# Patient Record
Sex: FEMALE | Race: WHITE | Employment: FULL TIME | URBAN - METROPOLITAN AREA
[De-identification: names, ages, dates, MRNs, and addresses within clinical notes are randomized per-mention and may not be internally consistent; named-entity substitution may affect disease eponyms.]

---

## 2017-09-09 ENCOUNTER — TELEPHONE (OUTPATIENT)
Dept: INTERNAL MEDICINE CLINIC | Facility: CLINIC | Age: 37
End: 2017-09-09

## 2019-11-18 ENCOUNTER — OFFICE VISIT (OUTPATIENT)
Dept: INTERNAL MEDICINE CLINIC | Facility: CLINIC | Age: 39
End: 2019-11-18

## 2019-11-18 ENCOUNTER — NURSE TRIAGE (OUTPATIENT)
Dept: INTERNAL MEDICINE CLINIC | Facility: CLINIC | Age: 39
End: 2019-11-18

## 2019-11-18 VITALS
OXYGEN SATURATION: 98 % | SYSTOLIC BLOOD PRESSURE: 127 MMHG | HEIGHT: 63.5 IN | TEMPERATURE: 99 F | HEART RATE: 91 BPM | WEIGHT: 271 LBS | DIASTOLIC BLOOD PRESSURE: 86 MMHG | BODY MASS INDEX: 47.42 KG/M2

## 2019-11-18 DIAGNOSIS — J30.9 ALLERGIC RHINITIS, UNSPECIFIED SEASONALITY, UNSPECIFIED TRIGGER: Primary | ICD-10-CM

## 2019-11-18 DIAGNOSIS — R22.0 LIP SWELLING: ICD-10-CM

## 2019-11-18 PROCEDURE — 99203 OFFICE O/P NEW LOW 30 MIN: CPT | Performed by: PHYSICIAN ASSISTANT

## 2019-11-19 NOTE — PROGRESS NOTES
HPI:    Patient ID: Ti Azar is a 37year old female. HPI  Patient presents with lower lip swelling which started today. Patient notes that on Friday she developed a head cold which got worse on Sunday so she started taking Mucinex DM.   After she • Heart Disease Maternal Grandfather    • Heart Disease Paternal Grandfather    • Diabetes Paternal Grandfather          PHYSICAL EXAM:   /86 (BP Location: Right arm, Patient Position: Sitting, Cuff Size: large)   Pulse 91   Temp 98.6 °F (37 °C) (Ora Meds This Visit:  Requested Prescriptions      No prescriptions requested or ordered in this encounter       Imaging & Referrals:  None         WI#[id number]

## 2020-07-09 ENCOUNTER — TELEPHONE (OUTPATIENT)
Dept: INTERNAL MEDICINE CLINIC | Facility: CLINIC | Age: 40
End: 2020-07-09

## 2020-07-09 ENCOUNTER — NURSE TRIAGE (OUTPATIENT)
Dept: INTERNAL MEDICINE CLINIC | Facility: CLINIC | Age: 40
End: 2020-07-09

## 2020-07-09 ENCOUNTER — OFFICE VISIT (OUTPATIENT)
Dept: INTERNAL MEDICINE CLINIC | Facility: CLINIC | Age: 40
End: 2020-07-09

## 2020-07-09 VITALS
RESPIRATION RATE: 16 BRPM | BODY MASS INDEX: 40.12 KG/M2 | DIASTOLIC BLOOD PRESSURE: 81 MMHG | HEART RATE: 94 BPM | WEIGHT: 235 LBS | HEIGHT: 64 IN | SYSTOLIC BLOOD PRESSURE: 146 MMHG

## 2020-07-09 DIAGNOSIS — L50.9 HIVES: ICD-10-CM

## 2020-07-09 DIAGNOSIS — H57.89 EYE SWOLLEN, RIGHT: Primary | ICD-10-CM

## 2020-07-09 DIAGNOSIS — Z12.31 VISIT FOR SCREENING MAMMOGRAM: Primary | ICD-10-CM

## 2020-07-09 PROCEDURE — 99213 OFFICE O/P EST LOW 20 MIN: CPT | Performed by: INTERNAL MEDICINE

## 2020-07-09 RX ORDER — METHYLPREDNISOLONE 4 MG/1
TABLET ORAL
Qty: 1 KIT | Refills: 0 | Status: SHIPPED | OUTPATIENT
Start: 2020-07-09 | End: 2021-03-19 | Stop reason: ALTCHOICE

## 2020-07-09 NOTE — TELEPHONE ENCOUNTER
Pt overdue for mammo. Order generated, per SANTI's approval.     LM for pt - reminding her to schedule.

## 2020-07-09 NOTE — TELEPHONE ENCOUNTER
Action Requested: Summary for Provider     []  Critical Lab, Recommendations Needed  [] Need Additional Advice  [x]   FYI    []   Need Orders  [] Need Medications Sent to Pharmacy  []  Other     SUMMARY: Since last night right top eyelid is swollen and ali

## 2020-07-13 NOTE — PROGRESS NOTES
Kristi Paul is a 41year old female. HPI:     1. Eye swollen, right    The patient has noted some swelling under her right eyelid over the last several days. She has had this before in the last year and a recurrent basis with no obvious source found. swollen, right    Apply cold compresses to eye.   Because of the recurrent nature of the swelling on her eyelid which could be an actual hive in the area I have advised the patient to apply cold compresses to not place any other material on the eye and to s

## 2020-07-17 ENCOUNTER — TELEMEDICINE (OUTPATIENT)
Dept: INTERNAL MEDICINE CLINIC | Facility: CLINIC | Age: 40
End: 2020-07-17

## 2020-07-17 VITALS — WEIGHT: 235 LBS | HEIGHT: 64 IN | BODY MASS INDEX: 40.12 KG/M2

## 2020-07-17 DIAGNOSIS — Z71.89 COUNSELED ABOUT COVID-19 VIRUS INFECTION: ICD-10-CM

## 2020-07-17 DIAGNOSIS — T78.3XXD ANGIOEDEMA, SUBSEQUENT ENCOUNTER: Primary | ICD-10-CM

## 2020-07-17 PROBLEM — T78.3XXA ANGIOEDEMA: Status: ACTIVE | Noted: 2020-07-17

## 2020-07-17 PROCEDURE — 3008F BODY MASS INDEX DOCD: CPT | Performed by: INTERNAL MEDICINE

## 2020-07-17 PROCEDURE — 99214 OFFICE O/P EST MOD 30 MIN: CPT | Performed by: INTERNAL MEDICINE

## 2020-07-18 NOTE — ASSESSMENT & PLAN NOTE
Recurrent episodes of swelling perioral, periorbital lasting for about 4 to 6 hours, gradually resolving. Started about a year back during the summer, resolved during the winter and recurred again in March.   Has not noted any specific association with john

## 2020-07-18 NOTE — PROGRESS NOTES
HPI:    Patient ID: Ben Thompson is a 41year old female.   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 Is worried about risks for herself as well as her family. Had an extensive discussion about the real risks that she may face.   Given the fact that the children at school are a relatively healthy population and because fernando ALLERGY OR) Take by mouth. Allergies:  Mucinex Dm Maximum *    HIVES, SWELLING  Adhesive Tape           RASH     There were no vitals filed for this visit. Body mass index is 40.34 kg/m².     PHYSICAL EXAM:   Physical Exam   Constitutional: She is or type 2 diabetes as a reason not to be available for in person classes. Explained to her that this is an air-bone disease, she will need to wear a mask. She will need to reduce direct contact with the students and maintain safe distance at all times.   Jose Stroud

## 2020-07-18 NOTE — ASSESSMENT & PLAN NOTE
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     Patient is concerned about her body habitus and the fact that her  has type 2 diabetes as a reason not

## 2020-08-06 ENCOUNTER — NURSE ONLY (OUTPATIENT)
Dept: ALLERGY | Facility: CLINIC | Age: 40
End: 2020-08-06

## 2020-08-06 ENCOUNTER — OFFICE VISIT (OUTPATIENT)
Dept: ALLERGY | Facility: CLINIC | Age: 40
End: 2020-08-06

## 2020-08-06 ENCOUNTER — LAB ENCOUNTER (OUTPATIENT)
Dept: LAB | Age: 40
End: 2020-08-06
Attending: ALLERGY & IMMUNOLOGY
Payer: COMMERCIAL

## 2020-08-06 VITALS
SYSTOLIC BLOOD PRESSURE: 142 MMHG | TEMPERATURE: 99 F | HEART RATE: 88 BPM | RESPIRATION RATE: 16 BRPM | DIASTOLIC BLOOD PRESSURE: 89 MMHG | OXYGEN SATURATION: 98 %

## 2020-08-06 DIAGNOSIS — T78.3XXA ANGIOEDEMA, INITIAL ENCOUNTER: Primary | ICD-10-CM

## 2020-08-06 DIAGNOSIS — R22.0 LIP SWELLING: ICD-10-CM

## 2020-08-06 DIAGNOSIS — T78.3XXA ANGIOEDEMA, INITIAL ENCOUNTER: ICD-10-CM

## 2020-08-06 DIAGNOSIS — Z91.018 FOOD ALLERGY: ICD-10-CM

## 2020-08-06 LAB
C4 SERPL-MCNC: 28.9 MG/DL (ref 10–40)
TSI SER-ACNC: 3.24 MIU/ML (ref 0.36–3.74)

## 2020-08-06 PROCEDURE — 99244 OFF/OP CNSLTJ NEW/EST MOD 40: CPT | Performed by: ALLERGY & IMMUNOLOGY

## 2020-08-06 PROCEDURE — 36415 COLL VENOUS BLD VENIPUNCTURE: CPT

## 2020-08-06 PROCEDURE — 84443 ASSAY THYROID STIM HORMONE: CPT

## 2020-08-06 PROCEDURE — 86160 COMPLEMENT ANTIGEN: CPT

## 2020-08-06 PROCEDURE — 95024 IQ TESTS W/ALLERGENIC XTRCS: CPT | Performed by: ALLERGY & IMMUNOLOGY

## 2020-08-06 PROCEDURE — 86003 ALLG SPEC IGE CRUDE XTRC EA: CPT

## 2020-08-06 PROCEDURE — 3077F SYST BP >= 140 MM HG: CPT | Performed by: ALLERGY & IMMUNOLOGY

## 2020-08-06 PROCEDURE — 3079F DIAST BP 80-89 MM HG: CPT | Performed by: ALLERGY & IMMUNOLOGY

## 2020-08-06 PROCEDURE — 95004 PERQ TESTS W/ALRGNC XTRCS: CPT | Performed by: ALLERGY & IMMUNOLOGY

## 2020-08-06 RX ORDER — EPINEPHRINE 0.3 MG/.3ML
INJECTION SUBCUTANEOUS
Qty: 1 EACH | Refills: 0 | Status: SHIPPED | OUTPATIENT
Start: 2020-08-06

## 2020-08-06 RX ORDER — LEVOCETIRIZINE DIHYDROCHLORIDE 5 MG/1
5 TABLET, FILM COATED ORAL NIGHTLY
Qty: 30 TABLET | Refills: 0 | Status: SHIPPED | OUTPATIENT
Start: 2020-08-06 | End: 2021-09-21

## 2020-08-06 NOTE — PATIENT INSTRUCTIONS
Recs:   Handouts on angioedema provided and reviewed including common triggers  Check TSH level and C4  Check serum IgE to Beets   Recommend Xyzal 5 mg once a night at bedtime as needed  May increase Xyzal up to 2-4 times per day if needed  Consider tria

## 2020-08-06 NOTE — PROGRESS NOTES
Vasquez Gutierrez is a 41year old female. HPI:   Patient presents with:  New Patient: Referred by Dr. Jessica Berg for Allergic Reaction.      Patient is a 24-year-old female who presents for allergy consultation upon referral of her PCP, Dr. Dewayne Reddy tobacco: Never Used      Tobacco comment: No Household Smokers.      Alcohol use: No      Alcohol/week: 0.0 standard drinks    Drug use: No       Medications (Active prior to today's visit):  Current Outpatient Medications   Medication Sig Dispense Refill noted  Respiratory: normal to inspection lungs are clear to auscultation bilaterally normal respiratory effort   99% RA  Cardiovascular: regular rate and rhythm no murmurs, gallups, or rubs  Abdomen: soft non-tender non-distended  Skin/Hair: no unusual josue Dihydrochloride (XYZAL) 5 MG Oral Tab 30 tablet 0     Sig: Take 1 tablet (5 mg total) by mouth nightly.        Imaging & Referrals:  None     8/6/2020  Genesis Walter MD      If medication samples were provided today, they were provided solely for tari

## 2020-08-15 ENCOUNTER — TELEPHONE (OUTPATIENT)
Dept: ALLERGY | Facility: CLINIC | Age: 40
End: 2020-08-15

## 2020-08-15 NOTE — TELEPHONE ENCOUNTER
Viewed by Jorge Richards on 8/15/2020  7:48 AM   Written by Wilton Hart MD on 8/6/2020  3:30 PM   Sejal, your C4/complement 4 level and TSH were normal.  This is good news.  Please keep me posted of any further episodes of swelling.  Regards, Dr. Ilda Bell

## 2020-08-15 NOTE — TELEPHONE ENCOUNTER
Patient calling in to go over results. Patient confirmed name and . Patient verbalizes understanding of results and has no further questions at this time.

## 2020-08-15 NOTE — TELEPHONE ENCOUNTER
----- Message from Kevin Salamanca MD sent at 8/15/2020  7:42 AM CDT -----  Please call patient with negative serum IgE testing to beet

## 2020-08-15 NOTE — TELEPHONE ENCOUNTER
----- Message from Katherine Burk MD sent at 8/15/2020  7:42 AM CDT -----  Please call patient with negative serum IgE testing to beet

## 2020-09-02 ENCOUNTER — PATIENT MESSAGE (OUTPATIENT)
Dept: ALLERGY | Facility: CLINIC | Age: 40
End: 2020-09-02

## 2020-09-03 NOTE — TELEPHONE ENCOUNTER
Patient contacted, patient given Dr. Isabelle Calvillo advice as below. Patient agreed to avoid the ingredients in her Homemade Pancakes and the Carries Syrup.   Patient denies wanting to schedule a f/u appt at this time and states that if she has another angioedema

## 2020-09-03 NOTE — TELEPHONE ENCOUNTER
From: Dar Burnham  To: Joi Rowan MD  Sent: 9/2/2020 5:28 PM CDT  Subject: Non-Urgent Medical Question    Hi Dr Earnestine Weathers, I hope this is an ok way to send you a message about my face swelling. I know you said to tell you if it happened again.  This ti gain, and I'm concerned about that, since I've been doing well on my diet this year (I use an ed to help me count calories and I have lost 40 pounds so far) however, I am not eager to have an obstacle to my continued weight loss efforts.  I wanted your adv

## 2020-09-03 NOTE — TELEPHONE ENCOUNTER
Call reviewed and noted. Recommend to avoid those foods that are in question as potential triggers.   As per my previous note continue with Xyzal 5 mg once a night at bedtime up to 4 times per day if needed Benadryl every 4-6 hours as needed if refractory

## 2020-09-03 NOTE — TELEPHONE ENCOUNTER
Call reviewed and noted.   If her prior episode has resolved she may follow-up as needed or if worsening symptoms no later than 3 to 6 months from now

## 2020-09-03 NOTE — TELEPHONE ENCOUNTER
Patient contacted via telephone at [phone] this morning, 9/3/2020. Triage)    See patients MyChart message account of angioedema. Patient denies any current edema of face, tongue, lips, or airway.      Patient denies tightness in chest, shortness of br

## 2020-10-15 ENCOUNTER — NURSE TRIAGE (OUTPATIENT)
Dept: INTERNAL MEDICINE CLINIC | Facility: CLINIC | Age: 40
End: 2020-10-15

## 2020-10-15 NOTE — TELEPHONE ENCOUNTER
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 This morning her IBS flared up and had diarrhea and abdominal cramping.  Every morni

## 2020-10-15 NOTE — TELEPHONE ENCOUNTER
Okay to give her a note stating that she has IBS related symptoms. With regards to being overweight-  Being overweight does not increase the risk of daphnie Covid. It may increase the risk of complications very slightly.   I am not able to use that as

## 2020-10-17 NOTE — TELEPHONE ENCOUNTER
Skip Varela for Yue Miller Pt called us back today and she was informed of   message below. Pt stated that everyday she needs to answer 7 questions for her job and she answered yes to having loose stools and abd cramping.  Now her job needs a letter

## 2021-01-26 ENCOUNTER — TELEPHONE (OUTPATIENT)
Dept: ALLERGY | Facility: CLINIC | Age: 41
End: 2021-01-26

## 2021-01-26 NOTE — TELEPHONE ENCOUNTER
Patient is requesting to know if Dr. Rohit Weathers has any recommendations regarding patient getting the covid vaccine due to her allergies. Please advise.

## 2021-01-26 NOTE — TELEPHONE ENCOUNTER
Spoke with patient, reviewed Dr. Hannah Shankar recommendations:    \"Unless patient has a known history of reactions to polyethylene glycol or a prior adverse reaction to a prior Covid vaccine, the current national recommendations is that the benefits of Covid v

## 2021-02-26 ENCOUNTER — TELEPHONE (OUTPATIENT)
Dept: INTERNAL MEDICINE CLINIC | Facility: CLINIC | Age: 41
End: 2021-02-26

## 2021-02-27 ENCOUNTER — NURSE TRIAGE (OUTPATIENT)
Dept: INTERNAL MEDICINE CLINIC | Facility: CLINIC | Age: 41
End: 2021-02-27

## 2021-02-27 ENCOUNTER — OFFICE VISIT (OUTPATIENT)
Dept: FAMILY MEDICINE CLINIC | Facility: CLINIC | Age: 41
End: 2021-02-27

## 2021-02-27 VITALS
BODY MASS INDEX: 47.57 KG/M2 | HEIGHT: 64 IN | HEART RATE: 91 BPM | WEIGHT: 278.63 LBS | SYSTOLIC BLOOD PRESSURE: 131 MMHG | DIASTOLIC BLOOD PRESSURE: 85 MMHG | TEMPERATURE: 98 F
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
## 2021-02-27 DIAGNOSIS — S61.451A DOG BITE OF RIGHT HAND, INITIAL ENCOUNTER: Primary | ICD-10-CM

## 2021-02-27 PROBLEM — Z71.89 COUNSELED ABOUT COVID-19 VIRUS INFECTION: Status: RESOLVED | Noted: 2020-07-17 | Resolved: 2021-02-27

## 2021-02-27 PROCEDURE — 3008F BODY MASS INDEX DOCD: CPT | Performed by: PHYSICIAN ASSISTANT

## 2021-02-27 PROCEDURE — 99213 OFFICE O/P EST LOW 20 MIN: CPT | Performed by: PHYSICIAN ASSISTANT

## 2021-02-27 PROCEDURE — 3079F DIAST BP 80-89 MM HG: CPT | Performed by: PHYSICIAN ASSISTANT

## 2021-02-27 PROCEDURE — 3075F SYST BP GE 130 - 139MM HG: CPT | Performed by: PHYSICIAN ASSISTANT

## 2021-02-27 RX ORDER — AMOXICILLIN AND CLAVULANATE POTASSIUM 875; 125 MG/1; MG/1
1 TABLET, FILM COATED ORAL 2 TIMES DAILY
Qty: 14 TABLET | Refills: 0 | Status: SHIPPED | OUTPATIENT
Start: 2021-02-27 | End: 2021-03-06

## 2021-02-27 NOTE — PROGRESS NOTES
HPI:    Patient ID: Keli Richardson is a 41year old female. Patient presents for dog bite on her right hand and wrist that occurred yesterday afternoon. No fever/chills. No pain note now. Felt bruised last night. No draining.  Only broke the skin on the d hand. No draining. No tenderness noted. No warmth noted. Slight erythema noted. ASSESSMENT/PLAN:   1.  Dog bite of right hand, initial encounter  -After discussion with patient, advised the following:  -Told to take augmentin  -Educated pt on

## 2021-02-27 NOTE — TELEPHONE ENCOUNTER
Action Requested: Summary for Provider     []  Critical Lab, Recommendations Needed  [] Need Additional Advice  [x]   FYI    []   Need Orders  [] Need Medications Sent to Pharmacy  []  Other     SUMMARY: Patient states she was bit by a dog on the top of he

## 2021-03-01 ENCOUNTER — TELEPHONE (OUTPATIENT)
Dept: INTERNAL MEDICINE CLINIC | Facility: CLINIC | Age: 41
End: 2021-03-01

## 2021-03-01 ENCOUNTER — HOSPITAL ENCOUNTER (EMERGENCY)
Facility: HOSPITAL | Age: 41
Discharge: HOME | End: 2021-03-01
Payer: COMMERCIAL

## 2021-03-01 VITALS
RESPIRATION RATE: 17 BRPM | DIASTOLIC BLOOD PRESSURE: 92 MMHG | BODY MASS INDEX: 48.32 KG/M2 | TEMPERATURE: 98 F | OXYGEN SATURATION: 98 % | HEIGHT: 64 IN | HEART RATE: 92 BPM | SYSTOLIC BLOOD PRESSURE: 148 MMHG | WEIGHT: 283 LBS

## 2021-03-01 DIAGNOSIS — S61.451A DOG BITE OF RIGHT HAND, INITIAL ENCOUNTER: Primary | ICD-10-CM
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
## 2021-03-01 PROCEDURE — 90471 IMMUNIZATION ADMIN: CPT

## 2021-03-01 PROCEDURE — 99283 EMERGENCY DEPT VISIT LOW MDM: CPT

## 2021-03-01 NOTE — TELEPHONE ENCOUNTER
Informed patient of advice per Jose Ramon Henriquez. Patient had the Covid vaccination at 9:45 am today. Advised she inform the ER that she's had this as unknown possible interaction with rabies vaccine. Patient verbalizes understanding and agrees.

## 2021-03-01 NOTE — TELEPHONE ENCOUNTER
Message # [phone]         02/26/2021 05:16p   [[email or web address]]  To:  [email or web address]   From:  Valentino Rodney Primary MD:  Phone#:  [phone]  ----------------------------------------------------------------------  PT CALLED AND WANTS TO KNOW WHAT TO DO. GOT BIT BY DOG

## 2021-03-01 NOTE — TELEPHONE ENCOUNTER
Unfortunately we don't have the rabies vaccine. She should have to go to the ER to get the rabies shot.

## 2021-03-01 NOTE — ED PROVIDER NOTES
Patient Seen in: [facility] AND [facility] Emergency Department      History   Patient presents with:  Bite    Stated Complaint: dog bite     HPI/Subjective:   HPI    43-year-old female presents the emergency department for evaluation.   Patient states on Friday ED Triage Vitals [03/01/21 1213]   BP (!) 174/77   Pulse 102   Resp 17   Temp 98 °F (36.7 °C)   Temp src    SpO2 98 %   O2 Device None (Room air)       Current:BP (!) 174/77   Pulse 102   Temp 98 °F (36.7 °C)   Resp 17   Ht 162.6 cm (5' 4\")   Wt 128.4 kg Spoke with Dr. Lexis Gonzáles  with infectious disease. Since this bite appears to be provoked there is no reason at this point to suggest rabies. This was explained to the patient and she agrees.   Sensory and initially saw the patient to the vet was able to t

## 2021-03-01 NOTE — TELEPHONE ENCOUNTER
Spoke with pt,  verified, pt stated she was seen by Ashu NEVAREZ on 21 for a dog bite on right hand. Pt already started her abx ( Augmentin).   Pt stated the vet called her and since they don't know the hx of vaccination of the dog, stray dog, concer

## 2021-03-01 NOTE — ED INITIAL ASSESSMENT (HPI)
Pt presents with dog bite to right hand which occurred 1 week ago. Visible bruising to hand, wound closing and does not appear infected. Pt reports the bite was from a stray dog so the vet recommended a rabies shot. Pt taking oral antibiotics.     Pt receiv

## 2021-03-04 ENCOUNTER — TELEMEDICINE (OUTPATIENT)
Dept: INTERNAL MEDICINE CLINIC | Facility: CLINIC | Age: 41
End: 2021-03-04
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
## 2021-03-04 DIAGNOSIS — S61.451D DOG BITE OF RIGHT HAND, SUBSEQUENT ENCOUNTER: Primary | ICD-10-CM
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
## 2021-03-04 PROBLEM — S61.451A DOG BITE OF RIGHT HAND: Status: ACTIVE | Noted: 2021-03-04

## 2021-03-04 PROCEDURE — 99213 OFFICE O/P EST LOW 20 MIN: CPT | Performed by: NURSE PRACTITIONER

## 2021-03-04 NOTE — ASSESSMENT & PLAN NOTE
41year old female with recent dog bite. She was seen by PA Denver San and started on Augmentin. She is tolerating the Augmentin and her stool is soft.  She was initially seen on 2/27/201 and then her Vet told her to go to the ED because the dog was a stray and s

## 2021-03-04 NOTE — PROGRESS NOTES
HPI:    Patient ID: Janell Sarmiento is a 41year old female. Dog BIte  Follow up on Dog bite. She was initially seen on 2/27/2021.   The dog was a stray dog she took into her home and this dog had some diarrhea and she was cleaning the dog and he bit her rig Alcohol use: No        Alcohol/week: 0.0 standard drinks      Drug use: No      Sexual activity: Yes        Partners: Male    Other Topics      Concerns:        Caffeine Concern: Yes          coffee, 3cups weekly         Review of Systems   Constitution List Items Addressed This Visit     None             No follow-ups on file. No orders of the defined types were placed in this encounter.       Meds This Visit:  Requested Prescriptions      No prescriptions requested or ordered in this encounter       I

## 2021-03-13 DIAGNOSIS — Z23 NEED FOR VACCINATION: ICD-10-CM

## 2021-03-19 ENCOUNTER — OFFICE VISIT (OUTPATIENT)
Dept: INTERNAL MEDICINE CLINIC | Facility: CLINIC | Age: 41
End: 2021-03-19

## 2021-03-19 VITALS
HEIGHT: 63.2 IN | SYSTOLIC BLOOD PRESSURE: 110 MMHG | HEART RATE: 81 BPM | TEMPERATURE: 97 F | BODY MASS INDEX: 49.42 KG/M2 | DIASTOLIC BLOOD PRESSURE: 69 MMHG | WEIGHT: 282.38 LBS

## 2021-03-19 DIAGNOSIS — E53.9 VITAMIN B DEFICIENCY: ICD-10-CM

## 2021-03-19 DIAGNOSIS — Z12.31 VISIT FOR SCREENING MAMMOGRAM: ICD-10-CM

## 2021-03-19 DIAGNOSIS — E55.9 VITAMIN D DEFICIENCY: ICD-10-CM

## 2021-03-19 DIAGNOSIS — Z00.00 ROUTINE PHYSICAL EXAMINATION: Primary | ICD-10-CM

## 2021-03-19 DIAGNOSIS — E66.01 MORBID OBESITY WITH BODY MASS INDEX OF 40.0-49.9 (HCC): ICD-10-CM

## 2021-03-19 DIAGNOSIS — Z01.419 PAPANICOLAOU SMEAR, AS PART OF ROUTINE GYNECOLOGICAL EXAMINATION: ICD-10-CM

## 2021-03-19 PROCEDURE — 3078F DIAST BP <80 MM HG: CPT | Performed by: INTERNAL MEDICINE

## 2021-03-19 PROCEDURE — 3074F SYST BP LT 130 MM HG: CPT | Performed by: INTERNAL MEDICINE

## 2021-03-19 PROCEDURE — 3008F BODY MASS INDEX DOCD: CPT | Performed by: INTERNAL MEDICINE

## 2021-03-19 PROCEDURE — 99396 PREV VISIT EST AGE 40-64: CPT | Performed by: INTERNAL MEDICINE

## 2021-03-19 NOTE — ASSESSMENT & PLAN NOTE
Body mass index is 49.71 kg/m².    Wt Readings from Last 6 Encounters:  03/19/21 : 282 lb 6.4 oz (128.1 kg)  03/01/21 : 283 lb (128.4 kg)  02/27/21 : 278 lb 9.6 oz (126.4 kg)  07/17/20 : 235 lb (106.6 kg)  07/09/20 : 235 lb (106.6 kg)  11/18/19 : 271 lb (12

## 2021-03-19 NOTE — ASSESSMENT & PLAN NOTE
Normal exam.  Labs as ordered. Skin check normal.  No significant abnormal nevi. Breast exam completed–no palpable abnormalities, discharge from the nipples or axillary adenopathy.   Mammogram is due–recommend yearly mammogram as family history–maternal g

## 2021-03-19 NOTE — PATIENT INSTRUCTIONS
Problem List Items Addressed This Visit        Unprioritized    Morbid obesity with body mass index of 40.0-49.9 ([facility]     Body mass index is 49.71 kg/m².    Wt Readings from Last 6 Encounters:  03/19/21 : 282 lb 6.4 oz (128.1 kg)  03/01/21 : 283 lb (128.4 ASSAY, THYROID STIM HORMONE    URINALYSIS, ROUTINE    Vitamin B deficiency     Continue on over-the-counter supplementation of vitamin D. Recheck labs as ordered.          Relevant Orders    VITAMIN B12    Vitamin D deficiency     This has been supplemente

## 2021-03-19 NOTE — PROGRESS NOTES
HPI:   Brock Stockton is a 41year old female who presents for an Annual Health Visit.      Mammogram Never done     Pap Smear,3 Years due on 02/17/2019  Last pap 2/2016 per Dr Gus Santacruz    Allergies:     Mucinex Dm Maximum *    HIVES, SWELLING  Adhesive Tape Neurological: Negative. Hematological: Negative. Psychiatric/Behavioral: Negative.           EXAM:   /69 (BP Location: Left arm, Patient Position: Sitting, Cuff Size: large)   Pulse 81   Temp 97.3 °F (36.3 °C) (Tympanic)   Ht 5' 3.2\" (1.605 m inverted nipple, mass, nipple discharge, skin change or tenderness. Abdominal:      General: Bowel sounds are normal. There is no distension. Palpations: Abdomen is soft. There is no mass. Tenderness: There is no abdominal tenderness.  There is Gait: Gait normal.      Deep Tendon Reflexes: Reflexes normal.   Psychiatric:         Mood and Affect: Mood and affect normal.         Behavior: Behavior normal.         Thought Content:  Thought content normal.         Judgment: Judgment normal.          A nevi.  Breast exam completed–no palpable abnormalities, discharge from the nipples or axillary adenopathy. Mammogram is due–recommend yearly mammogram as family history–maternal grandmother with breast cancer at 36years of age.   No other family members w AM

## 2021-03-22 ENCOUNTER — LAB ENCOUNTER (OUTPATIENT)
Dept: LAB | Facility: HOSPITAL | Age: 41
End: 2021-03-22
Attending: INTERNAL MEDICINE
Payer: COMMERCIAL

## 2021-03-22 DIAGNOSIS — Z00.00 ROUTINE PHYSICAL EXAMINATION: ICD-10-CM

## 2021-03-22 DIAGNOSIS — E53.9 VITAMIN B DEFICIENCY: ICD-10-CM

## 2021-03-22 DIAGNOSIS — E55.9 VITAMIN D DEFICIENCY: ICD-10-CM

## 2021-03-22 LAB
25(OH)D3 SERPL-MCNC: 15.3 NG/ML (ref 30–100)
ALBUMIN SERPL-MCNC: 3.6 G/DL (ref 3.4–5)
ALBUMIN/GLOB SERPL: 0.9 {RATIO} (ref 1–2)
ALP LIVER SERPL-CCNC: 70 U/L
ALT SERPL-CCNC: 21 U/L
ANION GAP SERPL CALC-SCNC: 6 MMOL/L (ref 0–18)
AST SERPL-CCNC: 11 U/L (ref 15–37)
BASOPHILS # BLD AUTO: 0.05 X10(3) UL (ref 0–0.2)
BASOPHILS NFR BLD AUTO: 0.7 %
BILIRUB SERPL-MCNC: 0.5 MG/DL (ref 0.1–2)
BUN BLD-MCNC: 11 MG/DL (ref 7–18)
BUN/CREAT SERPL: 16.9 (ref 10–20)
CALCIUM BLD-MCNC: 8.9 MG/DL (ref 8.5–10.1)
CHLORIDE SERPL-SCNC: 105 MMOL/L (ref 98–112)
CHOLEST SMN-MCNC: 158 MG/DL (ref ?–200)
CO2 SERPL-SCNC: 28 MMOL/L (ref 21–32)
CREAT BLD-MCNC: 0.65 MG/DL
DEPRECATED RDW RBC AUTO: 43.4 FL (ref 35.1–46.3)
EOSINOPHIL # BLD AUTO: 0.15 X10(3) UL (ref 0–0.7)
EOSINOPHIL NFR BLD AUTO: 2.1 %
ERYTHROCYTE [DISTWIDTH] IN BLOOD BY AUTOMATED COUNT: 14.2 % (ref 11–15)
GLOBULIN PLAS-MCNC: 3.8 G/DL (ref 2.8–4.4)
GLUCOSE BLD-MCNC: 81 MG/DL (ref 70–99)
HCT VFR BLD AUTO: 41.5 %
HDLC SERPL-MCNC: 51 MG/DL (ref 40–59)
HGB BLD-MCNC: 13.3 G/DL
HPV I/H RISK 1 DNA SPEC QL NAA+PROBE: NEGATIVE
IMM GRANULOCYTES # BLD AUTO: 0.08 X10(3) UL (ref 0–1)
IMM GRANULOCYTES NFR BLD: 1.1 %
LDLC SERPL CALC-MCNC: 83 MG/DL (ref ?–100)
LYMPHOCYTES # BLD AUTO: 1.93 X10(3) UL (ref 1–4)
LYMPHOCYTES NFR BLD AUTO: 27.6 %
M PROTEIN MFR SERPL ELPH: 7.4 G/DL (ref 6.4–8.2)
MCH RBC QN AUTO: 27 PG (ref 26–34)
MCHC RBC AUTO-ENTMCNC: 32 G/DL (ref 31–37)
MCV RBC AUTO: 84.3 FL
MONOCYTES # BLD AUTO: 0.51 X10(3) UL (ref 0.1–1)
MONOCYTES NFR BLD AUTO: 7.3 %
NEUTROPHILS # BLD AUTO: 4.27 X10 (3) UL (ref 1.5–7.7)
NEUTROPHILS # BLD AUTO: 4.27 X10(3) UL (ref 1.5–7.7)
NEUTROPHILS NFR BLD AUTO: 61.2 %
NONHDLC SERPL-MCNC: 107 MG/DL (ref ?–130)
OSMOLALITY SERPL CALC.SUM OF ELEC: 286 MOSM/KG (ref 275–295)
PATIENT FASTING Y/N/NP: YES
PATIENT FASTING Y/N/NP: YES
PLATELET # BLD AUTO: 270 10(3)UL (ref 150–450)
POTASSIUM SERPL-SCNC: 4.1 MMOL/L (ref 3.5–5.1)
RBC # BLD AUTO: 4.92 X10(6)UL
SODIUM SERPL-SCNC: 139 MMOL/L (ref 136–145)
TRIGL SERPL-MCNC: 122 MG/DL (ref 30–149)
TSI SER-ACNC: 3.82 MIU/ML (ref 0.36–3.74)
VIT B12 SERPL-MCNC: 295 PG/ML (ref 193–986)
VLDLC SERPL CALC-MCNC: 24 MG/DL (ref 0–30)
WBC # BLD AUTO: 7 X10(3) UL (ref 4–11)

## 2021-03-22 PROCEDURE — 36415 COLL VENOUS BLD VENIPUNCTURE: CPT

## 2021-03-22 PROCEDURE — 80053 COMPREHEN METABOLIC PANEL: CPT

## 2021-03-22 PROCEDURE — 82607 VITAMIN B-12: CPT

## 2021-03-22 PROCEDURE — 82306 VITAMIN D 25 HYDROXY: CPT

## 2021-03-22 PROCEDURE — 85025 COMPLETE CBC W/AUTO DIFF WBC: CPT

## 2021-03-22 PROCEDURE — 84443 ASSAY THYROID STIM HORMONE: CPT

## 2021-03-22 PROCEDURE — 80061 LIPID PANEL: CPT

## 2021-04-01 ENCOUNTER — LAB ENCOUNTER (OUTPATIENT)
Dept: LAB | Facility: HOSPITAL | Age: 41
End: 2021-04-01
Attending: INTERNAL MEDICINE
Payer: COMMERCIAL

## 2021-04-01 DIAGNOSIS — Z00.00 ROUTINE PHYSICAL EXAMINATION: ICD-10-CM

## 2021-04-01 PROCEDURE — 81003 URINALYSIS AUTO W/O SCOPE: CPT

## 2021-06-08 ENCOUNTER — TELEPHONE (OUTPATIENT)
Dept: INTERNAL MEDICINE CLINIC | Facility: CLINIC | Age: 41
End: 2021-06-08

## 2021-06-08 NOTE — TELEPHONE ENCOUNTER
Pt stated she missed 2 calls from the Dr, stated she's a little concern Why ?, reviewed labs stated when she saw Vit D was low started taking OTC vit d 1000 units , TSH abn

## 2021-09-21 ENCOUNTER — OFFICE VISIT (OUTPATIENT)
Dept: INTERNAL MEDICINE CLINIC | Facility: CLINIC | Age: 41
End: 2021-09-21

## 2021-09-21 ENCOUNTER — LAB ENCOUNTER (OUTPATIENT)
Dept: LAB | Facility: HOSPITAL | Age: 41
End: 2021-09-21
Attending: INTERNAL MEDICINE
Payer: COMMERCIAL

## 2021-09-21 VITALS
BODY MASS INDEX: 45.67 KG/M2 | WEIGHT: 261 LBS | HEIGHT: 63.5 IN | TEMPERATURE: 98 F | RESPIRATION RATE: 18 BRPM | DIASTOLIC BLOOD PRESSURE: 82 MMHG | HEART RATE: 78 BPM | SYSTOLIC BLOOD PRESSURE: 126 MMHG

## 2021-09-21 DIAGNOSIS — E55.9 VITAMIN D DEFICIENCY: Primary | ICD-10-CM

## 2021-09-21 DIAGNOSIS — E55.9 VITAMIN D DEFICIENCY: ICD-10-CM

## 2021-09-21 DIAGNOSIS — R22.1 MASS OF RIGHT SIDE OF NECK: ICD-10-CM

## 2021-09-21 DIAGNOSIS — E07.9 THYROID DYSFUNCTION: ICD-10-CM

## 2021-09-21 DIAGNOSIS — E66.01 MORBID OBESITY WITH BODY MASS INDEX OF 40.0-49.9 (HCC): ICD-10-CM

## 2021-09-21 DIAGNOSIS — E53.9 VITAMIN B DEFICIENCY: ICD-10-CM

## 2021-09-21 PROBLEM — I88.9 CERVICAL ADENITIS: Status: ACTIVE | Noted: 2021-09-21

## 2021-09-21 LAB
BASOPHILS # BLD AUTO: 0.04 X10(3) UL (ref 0–0.2)
BASOPHILS NFR BLD AUTO: 0.5 %
DEPRECATED RDW RBC AUTO: 43.2 FL (ref 35.1–46.3)
EOSINOPHIL # BLD AUTO: 0.09 X10(3) UL (ref 0–0.7)
EOSINOPHIL NFR BLD AUTO: 1.2 %
ERYTHROCYTE [DISTWIDTH] IN BLOOD BY AUTOMATED COUNT: 14.2 % (ref 11–15)
HCT VFR BLD AUTO: 42.5 %
HGB BLD-MCNC: 13.6 G/DL
IMM GRANULOCYTES # BLD AUTO: 0.04 X10(3) UL (ref 0–1)
IMM GRANULOCYTES NFR BLD: 0.5 %
LYMPHOCYTES # BLD AUTO: 1.69 X10(3) UL (ref 1–4)
LYMPHOCYTES NFR BLD AUTO: 22.4 %
MCH RBC QN AUTO: 26.8 PG (ref 26–34)
MCHC RBC AUTO-ENTMCNC: 32 G/DL (ref 31–37)
MCV RBC AUTO: 83.7 FL
MONOCYTES # BLD AUTO: 0.53 X10(3) UL (ref 0.1–1)
MONOCYTES NFR BLD AUTO: 7 %
NEUTROPHILS # BLD AUTO: 5.17 X10 (3) UL (ref 1.5–7.7)
NEUTROPHILS # BLD AUTO: 5.17 X10(3) UL (ref 1.5–7.7)
NEUTROPHILS NFR BLD AUTO: 68.4 %
PLATELET # BLD AUTO: 278 10(3)UL (ref 150–450)
RBC # BLD AUTO: 5.08 X10(6)UL
T3FREE SERPL-MCNC: 2.28 PG/ML (ref 2.4–4.2)
T4 FREE SERPL-MCNC: 1.1 NG/DL (ref 0.8–1.7)
TSI SER-ACNC: 2.7 MIU/ML (ref 0.36–3.74)
VIT B12 SERPL-MCNC: 393 PG/ML (ref 193–986)
VIT D+METAB SERPL-MCNC: 27.2 NG/ML (ref 30–100)
WBC # BLD AUTO: 7.6 X10(3) UL (ref 4–11)

## 2021-09-21 PROCEDURE — 3079F DIAST BP 80-89 MM HG: CPT | Performed by: INTERNAL MEDICINE

## 2021-09-21 PROCEDURE — 36415 COLL VENOUS BLD VENIPUNCTURE: CPT

## 2021-09-21 PROCEDURE — 84439 ASSAY OF FREE THYROXINE: CPT

## 2021-09-21 PROCEDURE — 84443 ASSAY THYROID STIM HORMONE: CPT

## 2021-09-21 PROCEDURE — 82607 VITAMIN B-12: CPT

## 2021-09-21 PROCEDURE — 3074F SYST BP LT 130 MM HG: CPT | Performed by: INTERNAL MEDICINE

## 2021-09-21 PROCEDURE — 3008F BODY MASS INDEX DOCD: CPT | Performed by: INTERNAL MEDICINE

## 2021-09-21 PROCEDURE — 84481 FREE ASSAY (FT-3): CPT

## 2021-09-21 PROCEDURE — 99214 OFFICE O/P EST MOD 30 MIN: CPT | Performed by: INTERNAL MEDICINE

## 2021-09-21 PROCEDURE — 85025 COMPLETE CBC W/AUTO DIFF WBC: CPT

## 2021-09-21 PROCEDURE — 82306 VITAMIN D 25 HYDROXY: CPT

## 2021-09-21 RX ORDER — LEVOCETIRIZINE DIHYDROCHLORIDE 5 MG/1
5 TABLET, FILM COATED ORAL NIGHTLY
Qty: 30 TABLET | Refills: 5 | Status: SHIPPED | OUTPATIENT
Start: 2021-09-21

## 2021-09-21 NOTE — PATIENT INSTRUCTIONS
Problem List Items Addressed This Visit        Unprioritized    Mass of right side of neck     Well-circumscribed smooth mass along the sternomastoid muscle. Unable to fully delineate separate to the muscle. No other areas of cervical adenitis noted.   Wilda Garcia

## 2021-09-21 NOTE — ASSESSMENT & PLAN NOTE
Well-circumscribed smooth mass along the sternomastoid muscle. Unable to fully delineate separate to the muscle. No other areas of cervical adenitis noted. Thyroid feels normal to palpation.   CT scan of the neck has been ordered, follow-up after complet

## 2021-09-21 NOTE — PROGRESS NOTES
HPI:    Patient ID: Junior Henderson is a 35year old female. Last labs completed in March mild thyroid dysfunction and vitamin D deficiency. Has been on OTC supplements, due for recheck labs. Obesity  This is a chronic problem.  The current episode sta habit.   Endocrine: Negative. Genitourinary: Negative. Musculoskeletal: Negative. Negative for neck pain. Skin: Negative. Allergic/Immunologic: Negative. Neurological: Negative. Negative for weakness. Hematological: Negative.     Psychiatr General: Bowel sounds are normal. There is no distension. Palpations: There is no mass. Tenderness: There is no abdominal tenderness. There is no rebound. Musculoskeletal:         General: No tenderness. Normal range of motion.       Cervical calorie counting ed as well as watching her diet. Advised to start on a daily exercise regimen for about 15 minutes daily. Reassess at her next visit.            Thyroid dysfunction     Mild thyroid dysfunction, repeat labs including free T3 and free T4

## 2021-09-21 NOTE — ASSESSMENT & PLAN NOTE
Body mass index is 45.51 kg/m².    Wt Readings from Last 6 Encounters:  09/21/21 : 261 lb (118.4 kg)  03/19/21 : 282 lb 6.4 oz (128.1 kg)  03/01/21 : 283 lb (128.4 kg)  02/27/21 : 278 lb 9.6 oz (126.4 kg)  07/17/20 : 235 lb (106.6 kg)  07/09/20 : 235 lb (10

## 2021-09-22 ENCOUNTER — TELEPHONE (OUTPATIENT)
Dept: INTERNAL MEDICINE CLINIC | Facility: CLINIC | Age: 41
End: 2021-09-22

## 2021-09-22 NOTE — TELEPHONE ENCOUNTER
Verified name and  of patient.  of patient (emergency contact) calling- he states that during her appointment, Dr. Bear Noyola had requested that she make an appointment specifically with her within 2 weeks to go over CT scan results.     Since there a

## 2021-09-22 NOTE — TELEPHONE ENCOUNTER
Verified name and  of patient.  of patient was informed that appointment was scheduled with Dr. Sharyle Mealing. He states he will confirm with patient and call back to cancel appointment with [facility].     Future Appointments   Date Time Provider Lesa

## 2021-09-23 ENCOUNTER — TELEPHONE (OUTPATIENT)
Dept: INTERNAL MEDICINE CLINIC | Facility: CLINIC | Age: 41
End: 2021-09-23

## 2021-09-23 ENCOUNTER — TELEPHONE (OUTPATIENT)
Dept: ALLERGY | Facility: CLINIC | Age: 41
End: 2021-09-23

## 2021-09-23 DIAGNOSIS — R22.0 FACIAL SWELLING: Primary | ICD-10-CM

## 2021-09-23 NOTE — TELEPHONE ENCOUNTER
RN called pt to notify her that Dr. Jo-Ann Butler agreed with the triage recommendations that [facility] RN had given her over the phone.      RN advised pt to continue to monitor for any further facial swelling, any difficulties breathing, talking or swallowing and any

## 2021-09-23 NOTE — TELEPHONE ENCOUNTER
Pt would like a referral to see Dr. Heath Mejias. Pt has an appt scheduled for 9-28-21 for video visit. Please, call pt with any questions.

## 2021-09-23 NOTE — TELEPHONE ENCOUNTER
Spoke with patient. Verified name and . Patient states she woke up this morning with facial swelling, on the bridge of her nose and left eye.  Patient states she has been taking Xyzal 5 mg-1 tablet daily since 2021 and took another Xyzal 5 mg this

## 2021-09-24 NOTE — TELEPHONE ENCOUNTER
Dr. Maricruz Harry please advise message    Patient requesting referral to Dr. Fredy Kimball due to facial swelling    Referral pended please sign if appropriate.

## 2021-09-24 NOTE — TELEPHONE ENCOUNTER
Please enter referral into [facility]. Thank you, Manjinder Perea Specialist    Managed Care.

## 2021-09-25 ENCOUNTER — HOSPITAL ENCOUNTER (OUTPATIENT)
Dept: CT IMAGING | Age: 41
Discharge: HOME | End: 2021-09-25
Attending: INTERNAL MEDICINE
Payer: COMMERCIAL

## 2021-09-25 ENCOUNTER — HOSPITAL ENCOUNTER (OUTPATIENT)
Dept: MAMMOGRAPHY | Age: 41
Discharge: HOME | End: 2021-09-25
Attending: INTERNAL MEDICINE
Payer: COMMERCIAL

## 2021-09-25 DIAGNOSIS — R22.1 MASS OF RIGHT SIDE OF NECK: ICD-10-CM

## 2021-09-25 DIAGNOSIS — Z12.31 VISIT FOR SCREENING MAMMOGRAM: ICD-10-CM

## 2021-09-25 LAB — CREAT BLD-MCNC: 0.7 MG/DL

## 2021-09-25 PROCEDURE — 77063 BREAST TOMOSYNTHESIS BI: CPT | Performed by: INTERNAL MEDICINE

## 2021-09-25 PROCEDURE — 77067 SCR MAMMO BI INCL CAD: CPT | Performed by: INTERNAL MEDICINE

## 2021-09-25 PROCEDURE — 82565 ASSAY OF CREATININE: CPT

## 2021-09-25 PROCEDURE — 70491 CT SOFT TISSUE NECK W/DYE: CPT | Performed by: INTERNAL MEDICINE

## 2021-09-28 ENCOUNTER — TELEMEDICINE (OUTPATIENT)
Dept: ALLERGY | Facility: CLINIC | Age: 41
End: 2021-09-28

## 2021-09-28 DIAGNOSIS — J30.1 SEASONAL ALLERGIC RHINITIS DUE TO POLLEN: ICD-10-CM

## 2021-09-28 DIAGNOSIS — T78.3XXA ANGIOEDEMA, INITIAL ENCOUNTER: Primary | ICD-10-CM

## 2021-09-28 DIAGNOSIS — E04.1 THYROID NODULE: ICD-10-CM

## 2021-09-28 PROCEDURE — 99214 OFFICE O/P EST MOD 30 MIN: CPT | Performed by: ALLERGY & IMMUNOLOGY

## 2021-09-28 RX ORDER — EPINEPHRINE 0.3 MG/.3ML
INJECTION SUBCUTANEOUS
Qty: 1 EACH | Refills: 0 | Status: SHIPPED | OUTPATIENT
Start: 2021-09-28 | End: 2021-10-07

## 2021-09-28 RX ORDER — METHYLPREDNISOLONE 4 MG/1
TABLET ORAL
Qty: 21 TABLET | Refills: 0 | Status: SHIPPED | OUTPATIENT
Start: 2021-09-28 | End: 2021-12-23 | Stop reason: ALTCHOICE

## 2021-09-28 NOTE — PROGRESS NOTES
Seven Cisneros is a 35year old female. HPI:   No chief complaint on file. Patient is a 47-year-old female who presents for follow-up via video visit with a chief complaint of allergies. Patient last seen by me in August 2020.   At last visit tari Past Surgical History:   Procedure Laterality Date   • CHOLECYSTECTOMY  2005   • COLONOSCOPY  2/25/2010      Family History   Problem Relation Age of Onset   • Breast Cancer Maternal Grandmother 36   • Cancer Maternal Grandmother 79        bone   • Elizabet Sheth dyspnea and wheezing    PHYSICAL EXAM:   Constitutional: responsive, no acute distress noted  Head/Face: NC/Atraumatic  Eyes/Vision: conjunctiva and lids are normal extraocular motion is intact   Ears/Audiometry: tympanic membranes are normal bilaterally h visit):  Current Outpatient Medications   Medication Sig Dispense Refill   • EPINEPHrine (EPIPEN 2-BRITTANY) 0.3 MG/0.3ML Injection Solution Auto-injector Inject IM in event of  allergic reaction 1 each 0   • methylPREDNISolone 4 MG Oral Tablet Therapy Pack Use MARY  Reviewed avoidance measures and potential treatment option of immunotherapy  Xyzal as an antihistamine if runny drippy itchy and sneezing  Wonais or Nasacort 2 sprays per nostril once a day if having prominent nasal congestion postnasal drip    3.   [facility] & Referrals:  None     9/28/2021  Payam Carrasco MD    If medication samples were provided today, they were provided solely for patient education and training related to self administration of these medications.   Teaching, instruction and

## 2021-10-07 ENCOUNTER — OFFICE VISIT (OUTPATIENT)
Dept: INTERNAL MEDICINE CLINIC | Facility: CLINIC | Age: 41
End: 2021-10-07

## 2021-10-07 VITALS
HEIGHT: 63.5 IN | TEMPERATURE: 98 F | WEIGHT: 258 LBS | DIASTOLIC BLOOD PRESSURE: 84 MMHG | HEART RATE: 84 BPM | SYSTOLIC BLOOD PRESSURE: 141 MMHG | BODY MASS INDEX: 45.15 KG/M2 | RESPIRATION RATE: 16 BRPM

## 2021-10-07 DIAGNOSIS — E55.9 VITAMIN D DEFICIENCY: ICD-10-CM

## 2021-10-07 DIAGNOSIS — Z23 NEED FOR VACCINATION: ICD-10-CM

## 2021-10-07 DIAGNOSIS — E04.1 THYROID NODULE GREATER THAN OR EQUAL TO 1.5 CM IN DIAMETER INCIDENTALLY NOTED ON IMAGING STUDY: ICD-10-CM

## 2021-10-07 DIAGNOSIS — R22.1 MASS OF RIGHT SIDE OF NECK: Primary | ICD-10-CM

## 2021-10-07 DIAGNOSIS — E07.9 THYROID DYSFUNCTION: ICD-10-CM

## 2021-10-07 DIAGNOSIS — E53.9 VITAMIN B DEFICIENCY: ICD-10-CM

## 2021-10-07 PROCEDURE — 3077F SYST BP >= 140 MM HG: CPT | Performed by: INTERNAL MEDICINE

## 2021-10-07 PROCEDURE — 3008F BODY MASS INDEX DOCD: CPT | Performed by: INTERNAL MEDICINE

## 2021-10-07 PROCEDURE — 99214 OFFICE O/P EST MOD 30 MIN: CPT | Performed by: INTERNAL MEDICINE

## 2021-10-07 PROCEDURE — 90686 IIV4 VACC NO PRSV 0.5 ML IM: CPT | Performed by: INTERNAL MEDICINE

## 2021-10-07 PROCEDURE — 90471 IMMUNIZATION ADMIN: CPT | Performed by: INTERNAL MEDICINE

## 2021-10-07 PROCEDURE — 3079F DIAST BP 80-89 MM HG: CPT | Performed by: INTERNAL MEDICINE

## 2021-10-07 NOTE — ASSESSMENT & PLAN NOTE
Mild thyroid dysfunction noted in the past but TSH and free T4 levels look normal.  Free T3 slightly low. Continue to monitor, recheck labs in about 3 months.

## 2021-10-07 NOTE — ASSESSMENT & PLAN NOTE
CT scan of the neck indicates a thyroid nodule. No suspicious lymph nodes in the area. Fine-needle aspiration recommended. Patient is willing to go through with the testing. Referral to Dr. Quinn Mendieta for an evaluation has been provided.   Advised to call 3

## 2021-10-07 NOTE — PROGRESS NOTES
HPI:    Patient ID: Scooter Garcia is a 35year old female. Impression  CONCLUSION:   1.  The patient's palpable abnormality in the right inferior neck appears to correspond to a heterogeneously enhancing 2.9 x 2.4 x 3.5 cm right thyroid nodule.  Other sm Associated symptoms comments: No fevers, chills or night sweats. No rash in the area but has had chronic allergies and has been on medications. Has been seen by Dr. Enedina Clarke. .       Review of Systems   Constitutional: Negative. HENT: Negative.     Eyes: rate and regular rhythm. Heart sounds: Normal heart sounds. No murmur heard. Pulmonary:      Effort: Pulmonary effort is normal. No respiratory distress. Breath sounds: Normal breath sounds. No wheezing or rales.    Chest:      Chest wall: No Mass of right side of neck - Primary     CT scan of the neck indicates a thyroid nodule. No suspicious lymph nodes in the area. Fine-needle aspiration recommended. Patient is willing to go through with the testing.   Referral to Dr. Jaspreet Hannah for an eval

## 2021-10-07 NOTE — ASSESSMENT & PLAN NOTE
Patient has been on vitamin D over-the-counter. Advised to take about 2002 3000 units on a daily basis. Vitamin D levels have improved significantly. We will continue to monitor.

## 2021-10-07 NOTE — PATIENT INSTRUCTIONS
Problem List Items Addressed This Visit        Unprioritized    Mass of right side of neck - Primary     CT scan of the neck indicates a thyroid nodule. No suspicious lymph nodes in the area. Fine-needle aspiration recommended.   Patient is willing to go

## 2021-10-09 ENCOUNTER — LAB ENCOUNTER (OUTPATIENT)
Dept: LAB | Facility: HOSPITAL | Age: 41
End: 2021-10-09
Attending: INTERNAL MEDICINE
Payer: COMMERCIAL

## 2021-10-09 DIAGNOSIS — T78.3XXA ANGIOEDEMA, INITIAL ENCOUNTER: ICD-10-CM

## 2021-10-09 DIAGNOSIS — E07.9 THYROID DYSFUNCTION: ICD-10-CM

## 2021-10-09 PROCEDURE — 36415 COLL VENOUS BLD VENIPUNCTURE: CPT

## 2021-10-09 PROCEDURE — 84443 ASSAY THYROID STIM HORMONE: CPT

## 2021-10-09 PROCEDURE — 84481 FREE ASSAY (FT-3): CPT

## 2021-10-09 PROCEDURE — 82785 ASSAY OF IGE: CPT

## 2021-10-09 PROCEDURE — 86160 COMPLEMENT ANTIGEN: CPT

## 2021-10-09 PROCEDURE — 86003 ALLG SPEC IGE CRUDE XTRC EA: CPT

## 2021-10-09 PROCEDURE — 84439 ASSAY OF FREE THYROXINE: CPT

## 2021-10-12 ENCOUNTER — TELEPHONE (OUTPATIENT)
Dept: ALLERGY | Facility: CLINIC | Age: 41
End: 2021-10-12

## 2021-10-12 NOTE — TELEPHONE ENCOUNTER
Call reviewed and noted. Recommend to avoid Band-Aids at this time. May consider sterile gauze with paper tape in the future if needed in place of Band-Aids. May consider patch testing to screen for allergic contact dermatitis including adhesives.   May

## 2021-10-12 NOTE — TELEPHONE ENCOUNTER
Discussed below message with patient. Encouraged her to look up patient information on the [employer] website for Chronic urticaria, and also about Patch Testing with [facility]. Patient to read about the two options.  She will contact our office if she wa

## 2021-10-12 NOTE — TELEPHONE ENCOUNTER
----- Message from Duc Falk MD sent at 10/12/2021  8:22 AM CDT -----   Please call patient with recent serum IgE profile to common environmental allergens. Patient did show IgE production to cockroach and ragweed.

## 2021-10-12 NOTE — TELEPHONE ENCOUNTER
Left a message for patient to contact our office regarding test results, see Dr. Maria Isabel Andujar message below.

## 2021-10-12 NOTE — TELEPHONE ENCOUNTER
Tete Pompa MD   10/9/2021  9:32 AM CDT         Sejal, your C4 level was normal.  This is good news.  You are allergy testing to common indoor and outdoor allergies are still pending.   Regards Dr. Simón Rincon     Patient reports over the weekend she had \"

## 2021-10-30 ENCOUNTER — LAB ENCOUNTER (OUTPATIENT)
Dept: LAB | Facility: HOSPITAL | Age: 41
End: 2021-10-30
Attending: INTERNAL MEDICINE
Payer: COMMERCIAL

## 2021-10-30 DIAGNOSIS — E04.1 THYROID NODULE GREATER THAN OR EQUAL TO 1.5 CM IN DIAMETER INCIDENTALLY NOTED ON IMAGING STUDY: ICD-10-CM

## 2021-10-30 DIAGNOSIS — R22.1 MASS OF RIGHT SIDE OF NECK: ICD-10-CM

## 2021-11-02 ENCOUNTER — HOSPITAL ENCOUNTER (OUTPATIENT)
Dept: ULTRASOUND IMAGING | Facility: HOSPITAL | Age: 41
Discharge: HOME | End: 2021-11-02
Attending: INTERNAL MEDICINE
Payer: COMMERCIAL

## 2021-11-02 VITALS
OXYGEN SATURATION: 100 % | BODY MASS INDEX: 42.68 KG/M2 | WEIGHT: 250 LBS | HEIGHT: 64 IN | DIASTOLIC BLOOD PRESSURE: 83 MMHG | SYSTOLIC BLOOD PRESSURE: 138 MMHG | HEART RATE: 74 BPM

## 2021-11-02 DIAGNOSIS — R22.1 MASS OF RIGHT SIDE OF NECK: ICD-10-CM

## 2021-11-02 DIAGNOSIS — E04.1 THYROID NODULE GREATER THAN OR EQUAL TO 1.5 CM IN DIAMETER INCIDENTALLY NOTED ON IMAGING STUDY: ICD-10-CM

## 2021-11-02 PROCEDURE — 10005 FNA BX W/US GDN 1ST LES: CPT | Performed by: INTERNAL MEDICINE

## 2021-11-02 PROCEDURE — 88172 CYTP DX EVAL FNA 1ST EA SITE: CPT | Performed by: INTERNAL MEDICINE

## 2021-11-02 PROCEDURE — 88173 CYTOPATH EVAL FNA REPORT: CPT | Performed by: INTERNAL MEDICINE

## 2021-11-02 PROCEDURE — 88177 CYTP FNA EVAL EA ADDL: CPT | Performed by: INTERNAL MEDICINE

## 2021-11-02 NOTE — IMAGING NOTE
1240:  Thalia Constantino arrived to ultrasound room #2  Ms. Estevan Mcclure identified with name and date of birth. Medications and allergies reviewed.   The following allergies were reported-   Mucinex Dm Maximum Strength [Dextromethorphan-guaifenesin]HIVES, SWELLING   Ad

## 2021-11-09 ENCOUNTER — OFFICE VISIT (OUTPATIENT)
Dept: OTOLARYNGOLOGY | Facility: CLINIC | Age: 41
End: 2021-11-09

## 2021-11-09 VITALS — WEIGHT: 250 LBS | BODY MASS INDEX: 42.68 KG/M2 | TEMPERATURE: 97 F | HEIGHT: 64 IN

## 2021-11-09 DIAGNOSIS — E04.1 THYROID NODULE: Primary | ICD-10-CM

## 2021-11-09 PROCEDURE — 3008F BODY MASS INDEX DOCD: CPT | Performed by: OTOLARYNGOLOGY

## 2021-11-09 PROCEDURE — 99243 OFF/OP CNSLTJ NEW/EST LOW 30: CPT | Performed by: OTOLARYNGOLOGY

## 2021-11-10 NOTE — PROGRESS NOTES
Adin Tenorio is a 35year old female. Patient presents with:  Consult: Patient Presents with: Consult  Thyroid Nodule       HISTORY OF PRESENT ILLNESS  She presents with a history of turning her neck and noting a large mass on the right side of her neck. • Cancer Maternal Grandfather         lung -smoker (cause of death)   • Heart Disease Maternal Grandfather    • Heart Disease Paternal Grandfather    • Diabetes Paternal Grandfather        Past Medical History:   Diagnosis Date   • Angioedema    • IBS (i Right: Normal, Left: Normal. TM - Right: Normal, Left: Normal.   Skin Normal Inspection - Normal.        Lymph Detail Normal Submental. Submandibular. Anterior cervical. Posterior cervical. Supraclavicular.         Nose/Mouth/Throat Normal External nose - N prepared using [employer] voice recognition dictation software. As a result errors may occur. When identified these errors have been corrected. While every attempt is made to correct errors during dictation discrepancies may still exist    Leonides Garcia Anis

## 2021-12-03 ENCOUNTER — HOSPITAL ENCOUNTER (OUTPATIENT)
Dept: ULTRASOUND IMAGING | Age: 41
Discharge: HOME | End: 2021-12-03
Attending: OTOLARYNGOLOGY
Payer: COMMERCIAL

## 2021-12-03 DIAGNOSIS — E04.1 THYROID NODULE: ICD-10-CM

## 2021-12-03 PROCEDURE — 76536 US EXAM OF HEAD AND NECK: CPT | Performed by: OTOLARYNGOLOGY

## 2021-12-07 ENCOUNTER — OFFICE VISIT (OUTPATIENT)
Dept: INTERNAL MEDICINE CLINIC | Facility: CLINIC | Age: 41
End: 2021-12-07

## 2021-12-07 VITALS
RESPIRATION RATE: 20 BRPM | WEIGHT: 256 LBS | SYSTOLIC BLOOD PRESSURE: 138 MMHG | HEART RATE: 88 BPM | TEMPERATURE: 98 F | BODY MASS INDEX: 43.71 KG/M2 | HEIGHT: 64 IN | DIASTOLIC BLOOD PRESSURE: 86 MMHG

## 2021-12-07 DIAGNOSIS — E53.9 VITAMIN B DEFICIENCY: ICD-10-CM

## 2021-12-07 DIAGNOSIS — E55.9 VITAMIN D DEFICIENCY: ICD-10-CM

## 2021-12-07 DIAGNOSIS — E66.01 MORBID OBESITY WITH BODY MASS INDEX OF 40.0-49.9 (HCC): ICD-10-CM

## 2021-12-07 DIAGNOSIS — E07.9 THYROID DYSFUNCTION: Primary | ICD-10-CM

## 2021-12-07 DIAGNOSIS — J30.9 ALLERGIC RHINITIS, UNSPECIFIED SEASONALITY, UNSPECIFIED TRIGGER: ICD-10-CM

## 2021-12-07 PROCEDURE — 3079F DIAST BP 80-89 MM HG: CPT | Performed by: INTERNAL MEDICINE

## 2021-12-07 PROCEDURE — 3008F BODY MASS INDEX DOCD: CPT | Performed by: INTERNAL MEDICINE

## 2021-12-07 PROCEDURE — 3075F SYST BP GE 130 - 139MM HG: CPT | Performed by: INTERNAL MEDICINE

## 2021-12-07 PROCEDURE — 99214 OFFICE O/P EST MOD 30 MIN: CPT | Performed by: INTERNAL MEDICINE

## 2021-12-07 RX ORDER — MONTELUKAST SODIUM 10 MG/1
10 TABLET ORAL DAILY
Qty: 90 TABLET | Refills: 1 | Status: SHIPPED | OUTPATIENT
Start: 2021-12-07 | End: 2022-12-02

## 2021-12-07 NOTE — ASSESSMENT & PLAN NOTE
Allergic rhinitis with diffuse hives at this time. Patient has been seen by Dr. Denver Miller. Allergy testing completed. Continue on Xyzal.  Started on montelukast in addition. Call for any other concerns. Advised to discuss this with Dr. Denver Miller.

## 2021-12-10 ENCOUNTER — OFFICE VISIT (OUTPATIENT)
Dept: OTOLARYNGOLOGY | Facility: CLINIC | Age: 41
End: 2021-12-10

## 2021-12-10 ENCOUNTER — TELEMEDICINE (OUTPATIENT)
Dept: INTERNAL MEDICINE CLINIC | Facility: CLINIC | Age: 41
End: 2021-12-10

## 2021-12-10 ENCOUNTER — TELEPHONE (OUTPATIENT)
Dept: INTERNAL MEDICINE CLINIC | Facility: CLINIC | Age: 41
End: 2021-12-10

## 2021-12-10 ENCOUNTER — TELEPHONE (OUTPATIENT)
Dept: ALLERGY | Facility: CLINIC | Age: 41
End: 2021-12-10

## 2021-12-10 VITALS — BODY MASS INDEX: 43.71 KG/M2 | HEIGHT: 64 IN | WEIGHT: 256 LBS

## 2021-12-10 DIAGNOSIS — T78.3XXD ANGIOEDEMA, SUBSEQUENT ENCOUNTER: Primary | ICD-10-CM

## 2021-12-10 DIAGNOSIS — E04.1 THYROID NODULE: Primary | ICD-10-CM

## 2021-12-10 DIAGNOSIS — J30.9 ALLERGIC RHINITIS, UNSPECIFIED SEASONALITY, UNSPECIFIED TRIGGER: ICD-10-CM

## 2021-12-10 PROCEDURE — 3008F BODY MASS INDEX DOCD: CPT | Performed by: OTOLARYNGOLOGY

## 2021-12-10 PROCEDURE — 99214 OFFICE O/P EST MOD 30 MIN: CPT | Performed by: INTERNAL MEDICINE

## 2021-12-10 PROCEDURE — 99214 OFFICE O/P EST MOD 30 MIN: CPT | Performed by: OTOLARYNGOLOGY

## 2021-12-10 RX ORDER — PREDNISONE 10 MG/1
TABLET ORAL
Qty: 50 TABLET | Refills: 0 | Status: SHIPPED | OUTPATIENT
Start: 2021-12-10 | End: 2021-12-23 | Stop reason: ALTCHOICE

## 2021-12-10 RX ORDER — FAMOTIDINE 40 MG/1
40 TABLET, FILM COATED ORAL DAILY
Qty: 90 TABLET | Refills: 0 | Status: SHIPPED | OUTPATIENT
Start: 2021-12-10

## 2021-12-10 NOTE — TELEPHONE ENCOUNTER
Video visit scheduled with Dr. Tamia Wylie. Patient c/o of allergy episodes every month  Or so. Last seen DR Amrik Perez. He is not in office today; patient transferred to Internal MED. Patient states she has a regimen she takes for allergy flare ups.  Xyzal

## 2021-12-10 NOTE — TELEPHONE ENCOUNTER
Pt calling to notify Dr. Nicholas Johnson that she has been experiencing hives and facial swelling since getting her COVID booster last Friday. Reports that she broke out in hives last Friday after booster and has since developed facial swelling.   She started taking

## 2021-12-10 NOTE — TELEPHONE ENCOUNTER
Spoke with patient directly. Hives started the same day as her 3rd dose of the Covid vaccine last Friday. Hives lasted approximately 3 to 4 days and resolved by Wednesday.   Eye swelling started later on Wednesday and has gone on to include cheek swelling

## 2021-12-10 NOTE — PROGRESS NOTES
Virtual Telephone Check-In    Dennis Hatch verbally consents to a Virtual/Telephone Check-In visit on 12/10/21. Patient has been referred to the [facility] website at [email or web address] to review the yearly Consent to Treat document.     Patient Anna Baker chest tightness and shortness of breath. Cardiovascular: Negative. Negative for chest pain and leg swelling. Gastrointestinal: Negative. Genitourinary: Negative for dysuria. Skin: Positive for rash. Neurological: Negative.           Current Out Never Used      Tobacco comment: No Household Smokers.      Vaping Use      Vaping Use: Never used    Alcohol use: No      Alcohol/week: 0.0 standard drinks    Drug use: No       PHYSICAL EXAM:    Physical Exam          Pt is alert  speaks in full sentences

## 2021-12-11 NOTE — PROGRESS NOTES
Keli Richardson is a 35year old female.   Patient presents with:  Results: Pt here to discuss thryoid US results  Other: Pt reporting exacerbation of allergies on face, reports hives swelling and redness (Covid booster on 12/3), taking prednisone per Dr. Tipton Bonds this since receiving the vaccine. Currently euthyroid. No other signs, symptoms or complaints.       Social History    Socioeconomic History      Marital status:     Tobacco Use      Smoking status: Never Smoker      Smokeless tobacco: Never Used Psychiatric Normal Orientation - Oriented to time, place, person & situation. Appropriate mood and affect.    Neck Exam Normal Inspection - Normal. Palpation - Normal. Parotid gland - Normal. Thyroid gland -large 3.5 centimeter nodule right hemithyroid Auto-injector, Inject IM in event of  allergic reaction, Disp: 1 each, Rfl: 0  •  diphenhydrAMINE HCl (BENADRYL ALLERGY OR), Take by mouth., Disp: , Rfl:   •  methylPREDNISolone 4 MG Oral Tablet Therapy Pack, Use as directed with food x 6 days (Patient not

## 2021-12-13 ENCOUNTER — TELEPHONE (OUTPATIENT)
Dept: OTOLARYNGOLOGY | Facility: CLINIC | Age: 41
End: 2021-12-13

## 2021-12-13 NOTE — TELEPHONE ENCOUNTER
Patient states doctor requested her to make an appointment with PCP for clearance on surgery. Patient states she got an appointment with the PA since her PCP is booked out and wants to confirm this is okay.  Please advise

## 2021-12-13 NOTE — TELEPHONE ENCOUNTER
Patient would like to schedule surgery. Patient pre op clearance is scheduled for 12/16/21.   Please advise

## 2021-12-14 NOTE — TELEPHONE ENCOUNTER
Patient is scheduled for surgery with Dr. Yennifer Winkler on 01/17/22 at [address] [place] [address].

## 2021-12-15 PROBLEM — S61.451A DOG BITE OF RIGHT HAND: Status: RESOLVED | Noted: 2021-03-04 | Resolved: 2021-12-15

## 2021-12-15 PROBLEM — Z00.00 ROUTINE PHYSICAL EXAMINATION: Status: RESOLVED | Noted: 2021-03-19 | Resolved: 2021-12-15
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
## 2021-12-15 PROBLEM — Z01.419 PAPANICOLAOU SMEAR, AS PART OF ROUTINE GYNECOLOGICAL EXAMINATION: Status: RESOLVED | Noted: 2021-03-19 | Resolved: 2021-12-15

## 2021-12-15 PROBLEM — R22.1 MASS OF RIGHT SIDE OF NECK: Status: RESOLVED | Noted: 2021-09-21 | Resolved: 2021-12-15

## 2021-12-23 ENCOUNTER — OFFICE VISIT (OUTPATIENT)
Dept: INTERNAL MEDICINE CLINIC | Facility: CLINIC | Age: 41
End: 2021-12-23

## 2021-12-23 VITALS
DIASTOLIC BLOOD PRESSURE: 80 MMHG | WEIGHT: 262 LBS | HEIGHT: 64 IN | SYSTOLIC BLOOD PRESSURE: 122 MMHG | HEART RATE: 90 BPM | BODY MASS INDEX: 44.73 KG/M2

## 2021-12-23 DIAGNOSIS — Z01.818 PRE-OP EXAM: ICD-10-CM

## 2021-12-23 DIAGNOSIS — Z01.818 PRE-OP EVALUATION: Primary | ICD-10-CM

## 2021-12-23 PROCEDURE — 3074F SYST BP LT 130 MM HG: CPT | Performed by: NURSE PRACTITIONER

## 2021-12-23 PROCEDURE — 3008F BODY MASS INDEX DOCD: CPT | Performed by: NURSE PRACTITIONER

## 2021-12-23 PROCEDURE — 99214 OFFICE O/P EST MOD 30 MIN: CPT | Performed by: NURSE PRACTITIONER

## 2021-12-23 PROCEDURE — 3079F DIAST BP 80-89 MM HG: CPT | Performed by: NURSE PRACTITIONER

## 2021-12-23 NOTE — PROGRESS NOTES
HPI:    Patient ID: Donna Olvera is a 35year old female. Pre-Op Exam (surgery is on 1/17/22 with Dr. Lay Fernandez thylukas)  No children. LMP- December 13, 2021. Patient is sexually active and does not use any birth control.   Patient is Medically Vernona Matte her recent ultrasound. Previous biopsy of a large nodule on the right demonstrated a follicular nodule which appeared to be benign.   Ultrasound recently performed demonstrates subcentimeter nodule on the left with a 3.7 cm nodule on the right which was pr demonstrates subcentimeter nodule on the left with a 3.7 cm nodule on the right which was previously biopsied. Here to discuss further management. She has been having issues with facial swelling that comes and goes.   Recently received her COVID-19 booste headaches. Hematological: Does not bruise/bleed easily. Psychiatric/Behavioral: Negative for dysphoric mood and sleep disturbance. The patient is not nervous/anxious.                Current Outpatient Medications   Medication Sig Dispense Refill   • fam abdominal tenderness. There is no right CVA tenderness, left CVA tenderness or guarding. Musculoskeletal:         General: No tenderness. Cervical back: Normal range of motion and neck supple. No tenderness. Right lower leg: No edema.       Left Date    CHOLEST 158 03/22/2021    TRIG 122 03/22/2021    HDL 51 03/22/2021    LDL 83 03/22/2021    VLDL 24 03/22/2021    NONHDLC 107 03/22/2021    CALCNONHDL 130 (H) 02/23/2014      Lab Results   Component Value Date    T4F 1.1 10/09/2021    TSH 2.840 10/0

## 2021-12-24 NOTE — ASSESSMENT & PLAN NOTE
Normal exam/Medically Cleared for Surgery    EKG -WNL  Labs - WNL  Chest X-ray- WNL    Stop all ibuprofen, aspirin, over-the-counter cough and cold medications, vitamins as well as supplements for 10 days prior to surgery.      Call if you develop any symp

## 2021-12-27 DIAGNOSIS — E04.1 RIGHT THYROID NODULE: Primary | ICD-10-CM

## 2022-01-04 ENCOUNTER — HOSPITAL ENCOUNTER (OUTPATIENT)
Dept: GENERAL RADIOLOGY | Age: 42
Discharge: HOME | End: 2022-01-04
Attending: NURSE PRACTITIONER
Payer: COMMERCIAL

## 2022-01-04 ENCOUNTER — EKG ENCOUNTER (OUTPATIENT)
Dept: LAB | Age: 42
End: 2022-01-04
Attending: NURSE PRACTITIONER
Payer: COMMERCIAL

## 2022-01-04 ENCOUNTER — LAB ENCOUNTER (OUTPATIENT)
Dept: LAB | Age: 42
End: 2022-01-04
Attending: NURSE PRACTITIONER
Payer: COMMERCIAL

## 2022-01-04 DIAGNOSIS — Z01.818 PRE-OP EVALUATION: ICD-10-CM

## 2022-01-04 DIAGNOSIS — Z01.818 PRE-OP EVALUATION: Primary | ICD-10-CM

## 2022-01-04 LAB
ALBUMIN SERPL-MCNC: 3.4 G/DL (ref 3.4–5)
ALBUMIN/GLOB SERPL: 1 {RATIO} (ref 1–2)
ALP LIVER SERPL-CCNC: 57 U/L
ALT SERPL-CCNC: 21 U/L
ANION GAP SERPL CALC-SCNC: 4 MMOL/L (ref 0–18)
APTT PPP: 32.5 SECONDS (ref 23.3–35.6)
AST SERPL-CCNC: 12 U/L (ref 15–37)
BASOPHILS # BLD AUTO: 0.04 X10(3) UL (ref 0–0.2)
BASOPHILS NFR BLD AUTO: 0.6 %
BILIRUB SERPL-MCNC: 0.8 MG/DL (ref 0.1–2)
BUN BLD-MCNC: 9 MG/DL (ref 7–18)
BUN/CREAT SERPL: 13 (ref 10–20)
CALCIUM BLD-MCNC: 8.8 MG/DL (ref 8.5–10.1)
CHLORIDE SERPL-SCNC: 106 MMOL/L (ref 98–112)
CO2 SERPL-SCNC: 29 MMOL/L (ref 21–32)
CREAT BLD-MCNC: 0.69 MG/DL
DEPRECATED RDW RBC AUTO: 45.4 FL (ref 35.1–46.3)
EOSINOPHIL # BLD AUTO: 0.18 X10(3) UL (ref 0–0.7)
EOSINOPHIL NFR BLD AUTO: 2.5 %
ERYTHROCYTE [DISTWIDTH] IN BLOOD BY AUTOMATED COUNT: 14.5 % (ref 11–15)
FASTING STATUS PATIENT QL REPORTED: YES
GLOBULIN PLAS-MCNC: 3.4 G/DL (ref 2.8–4.4)
GLUCOSE BLD-MCNC: 103 MG/DL (ref 70–99)
HCG SERPL QL: NEGATIVE
HCT VFR BLD AUTO: 40.9 %
HGB BLD-MCNC: 13.2 G/DL
IMM GRANULOCYTES # BLD AUTO: 0.04 X10(3) UL (ref 0–1)
IMM GRANULOCYTES NFR BLD: 0.6 %
INR BLD: 0.96 (ref 0.8–1.2)
LYMPHOCYTES # BLD AUTO: 1.93 X10(3) UL (ref 1–4)
LYMPHOCYTES NFR BLD AUTO: 27.1 %
MCH RBC QN AUTO: 27.4 PG (ref 26–34)
MCHC RBC AUTO-ENTMCNC: 32.3 G/DL (ref 31–37)
MCV RBC AUTO: 85 FL
MONOCYTES # BLD AUTO: 0.48 X10(3) UL (ref 0.1–1)
MONOCYTES NFR BLD AUTO: 6.7 %
NEUTROPHILS # BLD AUTO: 4.46 X10 (3) UL (ref 1.5–7.7)
NEUTROPHILS # BLD AUTO: 4.46 X10(3) UL (ref 1.5–7.7)
NEUTROPHILS NFR BLD AUTO: 62.5 %
OSMOLALITY SERPL CALC.SUM OF ELEC: 287 MOSM/KG (ref 275–295)
PLATELET # BLD AUTO: 271 10(3)UL (ref 150–450)
POTASSIUM SERPL-SCNC: 4.6 MMOL/L (ref 3.5–5.1)
PROT SERPL-MCNC: 6.8 G/DL (ref 6.4–8.2)
PROTHROMBIN TIME: 12.9 SECONDS (ref 11.6–14.8)
RBC # BLD AUTO: 4.81 X10(6)UL
SODIUM SERPL-SCNC: 139 MMOL/L (ref 136–145)
WBC # BLD AUTO: 7.1 X10(3) UL (ref 4–11)

## 2022-01-04 PROCEDURE — 93010 ELECTROCARDIOGRAM REPORT: CPT | Performed by: NURSE PRACTITIONER

## 2022-01-04 PROCEDURE — 85730 THROMBOPLASTIN TIME PARTIAL: CPT

## 2022-01-04 PROCEDURE — 84703 CHORIONIC GONADOTROPIN ASSAY: CPT

## 2022-01-04 PROCEDURE — 80053 COMPREHEN METABOLIC PANEL: CPT

## 2022-01-04 PROCEDURE — 93005 ELECTROCARDIOGRAM TRACING: CPT

## 2022-01-04 PROCEDURE — 85610 PROTHROMBIN TIME: CPT

## 2022-01-04 PROCEDURE — 71046 X-RAY EXAM CHEST 2 VIEWS: CPT | Performed by: NURSE PRACTITIONER

## 2022-01-04 PROCEDURE — 85025 COMPLETE CBC W/AUTO DIFF WBC: CPT

## 2022-01-04 PROCEDURE — 36415 COLL VENOUS BLD VENIPUNCTURE: CPT

## 2022-01-12 DIAGNOSIS — E04.1 THYROID NODULE: Primary | ICD-10-CM

## 2022-01-14 ENCOUNTER — LAB ENCOUNTER (OUTPATIENT)
Dept: LAB | Facility: HOSPITAL | Age: 42
End: 2022-01-14
Attending: OTOLARYNGOLOGY
Payer: COMMERCIAL

## 2022-01-14 DIAGNOSIS — Z01.818 PREOP TESTING: ICD-10-CM

## 2022-01-15 LAB — SARS-COV-2 RNA RESP QL NAA+PROBE: NOT DETECTED

## 2022-01-17 ENCOUNTER — HOSPITAL ENCOUNTER (OUTPATIENT)
Facility: HOSPITAL | Age: 42
Setting detail: HOSPITAL OUTPATIENT SURGERY
Discharge: HOME | End: 2022-01-17
Attending: OTOLARYNGOLOGY | Admitting: OTOLARYNGOLOGY
Payer: COMMERCIAL

## 2022-01-17 ENCOUNTER — ANESTHESIA (OUTPATIENT)
Dept: SURGERY | Facility: HOSPITAL | Age: 42
End: 2022-01-17
Payer: COMMERCIAL

## 2022-01-17 ENCOUNTER — ANESTHESIA EVENT (OUTPATIENT)
Dept: SURGERY | Facility: HOSPITAL | Age: 42
End: 2022-01-17
Payer: COMMERCIAL

## 2022-01-17 VITALS
HEART RATE: 76 BPM | BODY MASS INDEX: 44.9 KG/M2 | HEIGHT: 64 IN | DIASTOLIC BLOOD PRESSURE: 63 MMHG | RESPIRATION RATE: 18 BRPM | OXYGEN SATURATION: 95 % | WEIGHT: 263 LBS | SYSTOLIC BLOOD PRESSURE: 134 MMHG | TEMPERATURE: 98 F

## 2022-01-17 DIAGNOSIS — Z01.818 PREOP TESTING: Primary | ICD-10-CM

## 2022-01-17 DIAGNOSIS — E04.1 RIGHT THYROID NODULE: ICD-10-CM

## 2022-01-17 LAB — B-HCG UR QL: NEGATIVE

## 2022-01-17 PROCEDURE — 60220 PARTIAL REMOVAL OF THYROID: CPT | Performed by: OTOLARYNGOLOGY

## 2022-01-17 PROCEDURE — 0GTH0ZZ RESECTION OF RIGHT THYROID GLAND LOBE, OPEN APPROACH: ICD-10-PCS | Performed by: OTOLARYNGOLOGY

## 2022-01-17 RX ORDER — ONDANSETRON 2 MG/ML
4 INJECTION INTRAMUSCULAR; INTRAVENOUS ONCE AS NEEDED
Status: COMPLETED | OUTPATIENT
Start: 2022-01-17 | End: 2022-01-17

## 2022-01-17 RX ORDER — OXYCODONE HYDROCHLORIDE 5 MG/1
5 TABLET ORAL EVERY 4 HOURS PRN
OUTPATIENT
Start: 2022-01-17

## 2022-01-17 RX ORDER — HYDROCODONE BITARTRATE AND ACETAMINOPHEN 5; 325 MG/1; MG/1
2 TABLET ORAL AS NEEDED
Status: DISCONTINUED | OUTPATIENT
Start: 2022-01-17 | End: 2022-01-17

## 2022-01-17 RX ORDER — NEOSTIGMINE METHYLSULFATE 1 MG/ML
INJECTION INTRAVENOUS AS NEEDED
Status: DISCONTINUED | OUTPATIENT
Start: 2022-01-17 | End: 2022-01-17 | Stop reason: SURG

## 2022-01-17 RX ORDER — GLYCOPYRROLATE 0.2 MG/ML
INJECTION, SOLUTION INTRAMUSCULAR; INTRAVENOUS AS NEEDED
Status: DISCONTINUED | OUTPATIENT
Start: 2022-01-17 | End: 2022-01-17 | Stop reason: SURG

## 2022-01-17 RX ORDER — HYDROMORPHONE HYDROCHLORIDE 1 MG/ML
0.8 INJECTION, SOLUTION INTRAMUSCULAR; INTRAVENOUS; SUBCUTANEOUS EVERY 2 HOUR PRN
OUTPATIENT
Start: 2022-01-17

## 2022-01-17 RX ORDER — ACETAMINOPHEN 500 MG
1000 TABLET ORAL ONCE
Status: COMPLETED | OUTPATIENT
Start: 2022-01-17 | End: 2022-01-17

## 2022-01-17 RX ORDER — HYDROCODONE BITARTRATE AND ACETAMINOPHEN 7.5; 325 MG/1; MG/1
1 TABLET ORAL EVERY 6 HOURS PRN
Qty: 30 TABLET | Refills: 0 | Status: SHIPPED | OUTPATIENT
Start: 2022-01-17

## 2022-01-17 RX ORDER — HYDROMORPHONE HYDROCHLORIDE 1 MG/ML
0.6 INJECTION, SOLUTION INTRAMUSCULAR; INTRAVENOUS; SUBCUTANEOUS EVERY 5 MIN PRN
Status: DISCONTINUED | OUTPATIENT
Start: 2022-01-17 | End: 2022-01-17

## 2022-01-17 RX ORDER — METOCLOPRAMIDE 10 MG/1
10 TABLET ORAL ONCE
Status: COMPLETED | OUTPATIENT
Start: 2022-01-17 | End: 2022-01-17

## 2022-01-17 RX ORDER — DEXAMETHASONE SODIUM PHOSPHATE 4 MG/ML
VIAL (ML) INJECTION AS NEEDED
Status: DISCONTINUED | OUTPATIENT
Start: 2022-01-17 | End: 2022-01-17 | Stop reason: SURG

## 2022-01-17 RX ORDER — HYDROMORPHONE HYDROCHLORIDE 1 MG/ML
0.4 INJECTION, SOLUTION INTRAMUSCULAR; INTRAVENOUS; SUBCUTANEOUS EVERY 5 MIN PRN
Status: DISCONTINUED | OUTPATIENT
Start: 2022-01-17 | End: 2022-01-17

## 2022-01-17 RX ORDER — MORPHINE SULFATE 4 MG/ML
4 INJECTION, SOLUTION INTRAMUSCULAR; INTRAVENOUS EVERY 10 MIN PRN
Status: DISCONTINUED | OUTPATIENT
Start: 2022-01-17 | End: 2022-01-17

## 2022-01-17 RX ORDER — MORPHINE SULFATE 10 MG/ML
6 INJECTION, SOLUTION INTRAMUSCULAR; INTRAVENOUS EVERY 10 MIN PRN
Status: DISCONTINUED | OUTPATIENT
Start: 2022-01-17 | End: 2022-01-17

## 2022-01-17 RX ORDER — ONDANSETRON 2 MG/ML
INJECTION INTRAMUSCULAR; INTRAVENOUS AS NEEDED
Status: DISCONTINUED | OUTPATIENT
Start: 2022-01-17 | End: 2022-01-17 | Stop reason: SURG

## 2022-01-17 RX ORDER — MORPHINE SULFATE 4 MG/ML
2 INJECTION, SOLUTION INTRAMUSCULAR; INTRAVENOUS EVERY 10 MIN PRN
Status: DISCONTINUED | OUTPATIENT
Start: 2022-01-17 | End: 2022-01-17

## 2022-01-17 RX ORDER — SODIUM CHLORIDE, SODIUM LACTATE, POTASSIUM CHLORIDE, CALCIUM CHLORIDE 600; 310; 30; 20 MG/100ML; MG/100ML; MG/100ML; MG/100ML
INJECTION, SOLUTION INTRAVENOUS CONTINUOUS
Status: DISCONTINUED | OUTPATIENT
Start: 2022-01-17 | End: 2022-01-17

## 2022-01-17 RX ORDER — ROCURONIUM BROMIDE 10 MG/ML
INJECTION, SOLUTION INTRAVENOUS AS NEEDED
Status: DISCONTINUED | OUTPATIENT
Start: 2022-01-17 | End: 2022-01-17 | Stop reason: SURG

## 2022-01-17 RX ORDER — PROCHLORPERAZINE EDISYLATE 5 MG/ML
5 INJECTION INTRAMUSCULAR; INTRAVENOUS ONCE AS NEEDED
Status: DISCONTINUED | OUTPATIENT
Start: 2022-01-17 | End: 2022-01-17

## 2022-01-17 RX ORDER — HYDROMORPHONE HYDROCHLORIDE 1 MG/ML
0.2 INJECTION, SOLUTION INTRAMUSCULAR; INTRAVENOUS; SUBCUTANEOUS EVERY 5 MIN PRN
Status: DISCONTINUED | OUTPATIENT
Start: 2022-01-17 | End: 2022-01-17

## 2022-01-17 RX ORDER — ACETAMINOPHEN 325 MG/1
650 TABLET ORAL
OUTPATIENT
Start: 2022-01-17

## 2022-01-17 RX ORDER — HALOPERIDOL 5 MG/ML
0.25 INJECTION INTRAMUSCULAR ONCE AS NEEDED
Status: DISCONTINUED | OUTPATIENT
Start: 2022-01-17 | End: 2022-01-17

## 2022-01-17 RX ORDER — HYDROMORPHONE HYDROCHLORIDE 1 MG/ML
0.4 INJECTION, SOLUTION INTRAMUSCULAR; INTRAVENOUS; SUBCUTANEOUS EVERY 2 HOUR PRN
OUTPATIENT
Start: 2022-01-17

## 2022-01-17 RX ORDER — LIDOCAINE HYDROCHLORIDE AND EPINEPHRINE 10; 10 MG/ML; UG/ML
INJECTION, SOLUTION INFILTRATION; PERINEURAL AS NEEDED
Status: DISCONTINUED | OUTPATIENT
Start: 2022-01-17 | End: 2022-01-17 | Stop reason: HOSPADM

## 2022-01-17 RX ORDER — CEPHALEXIN 500 MG/1
500 CAPSULE ORAL EVERY 8 HOURS
Qty: 21 CAPSULE | Refills: 0 | Status: SHIPPED | OUTPATIENT
Start: 2022-01-17

## 2022-01-17 RX ORDER — SODIUM CHLORIDE, SODIUM LACTATE, POTASSIUM CHLORIDE, CALCIUM CHLORIDE 600; 310; 30; 20 MG/100ML; MG/100ML; MG/100ML; MG/100ML
INJECTION, SOLUTION INTRAVENOUS CONTINUOUS
OUTPATIENT
Start: 2022-01-17

## 2022-01-17 RX ORDER — SODIUM CHLORIDE 0.9 % (FLUSH) 0.9 %
10 SYRINGE (ML) INJECTION AS NEEDED
OUTPATIENT
Start: 2022-01-17

## 2022-01-17 RX ORDER — FAMOTIDINE 20 MG/1
20 TABLET ORAL ONCE
Status: COMPLETED | OUTPATIENT
Start: 2022-01-17 | End: 2022-01-17

## 2022-01-17 RX ORDER — HYDROCODONE BITARTRATE AND ACETAMINOPHEN 5; 325 MG/1; MG/1
1 TABLET ORAL AS NEEDED
Status: DISCONTINUED | OUTPATIENT
Start: 2022-01-17 | End: 2022-01-17

## 2022-01-17 RX ORDER — OXYCODONE HYDROCHLORIDE 5 MG/1
10 TABLET ORAL EVERY 4 HOURS PRN
OUTPATIENT
Start: 2022-01-17

## 2022-01-17 RX ORDER — MIDAZOLAM HYDROCHLORIDE 1 MG/ML
INJECTION INTRAMUSCULAR; INTRAVENOUS AS NEEDED
Status: DISCONTINUED | OUTPATIENT
Start: 2022-01-17 | End: 2022-01-17 | Stop reason: SURG

## 2022-01-17 RX ORDER — NALOXONE HYDROCHLORIDE 0.4 MG/ML
80 INJECTION, SOLUTION INTRAMUSCULAR; INTRAVENOUS; SUBCUTANEOUS AS NEEDED
Status: DISCONTINUED | OUTPATIENT
Start: 2022-01-17 | End: 2022-01-17

## 2022-01-17 RX ADMIN — DEXAMETHASONE SODIUM PHOSPHATE 4 MG: 4 MG/ML VIAL (ML) INJECTION at 08:12:00

## 2022-01-17 RX ADMIN — ROCURONIUM BROMIDE 40 MG: 10 INJECTION, SOLUTION INTRAVENOUS at 08:12:00

## 2022-01-17 RX ADMIN — MIDAZOLAM HYDROCHLORIDE 2 MG: 1 INJECTION INTRAMUSCULAR; INTRAVENOUS at 08:12:00

## 2022-01-17 RX ADMIN — NEOSTIGMINE METHYLSULFATE 5 MG: 1 INJECTION INTRAVENOUS at 08:56:00

## 2022-01-17 RX ADMIN — GLYCOPYRROLATE 1 MG: 0.2 INJECTION, SOLUTION INTRAMUSCULAR; INTRAVENOUS at 08:56:00

## 2022-01-17 RX ADMIN — ONDANSETRON 4 MG: 2 INJECTION INTRAMUSCULAR; INTRAVENOUS at 08:12:00

## 2022-01-17 NOTE — ANESTHESIA PROCEDURE NOTES
Airway  Date/Time: 1/17/2022 8:16 AM  Urgency: Elective    Airway not difficult    General Information and Staff    Patient location during procedure: OR  Anesthesiologist: Warnell Gosselin, MD  Resident/CRNA: Paola Jarquin CRNA  Performed: CRNA     Indication

## 2022-01-17 NOTE — ANESTHESIA PREPROCEDURE EVALUATION
Anesthesia PreOp Note    HPI:     Alex Gamez is a 35year old female who presents for preoperative consultation requested by: Mary Munguia MD    Date of Surgery: 1/17/2022    Procedure(s):  RIGHT THYROIDECTOMY  Indication: Right thyroid nodule [E04. 1 daily., Disp: 90 tablet, Rfl: 0, More than a month at Unknown time  montelukast (SINGULAIR) 10 MG Oral Tab, Take 1 tablet (10 mg total) by mouth daily.  (Patient not taking: Reported on 12/23/2021), Disp: 90 tablet, Rfl: 1      lactated ringers infusion, , Asked        Seat Belt: Not Asked        Self-Exams: Not Asked    Social History Narrative      Not on file    Social Determinants of Health  Financial Resource Strain: Not on file  Food Insecurity: Not on file  Transportation Needs: Not on file  Physical lips Xavier Newaygo swelling /occ diff breathing     Endo/Other    (-) diabetes mellitus, hypothyroidism  Abdominal  - normal exam               Anesthesia Plan:   ASA:  3  Plan:   General  Airway:  ETT and Video laryngoscope  Informed Consent Plan and Risks Discus

## 2022-01-17 NOTE — ANESTHESIA POSTPROCEDURE EVALUATION
Patient: Mil Bray    Procedure Summary     Date: 01/17/22 Room / Location: [address] [place] [address] MAIN OR 17 / [address] [place] [address] MAIN OR    Anesthesia Start: 1563 Anesthesia Stop: 0652    Procedure: RIGHT THYROIDECTOMY (Right Neck) Diagnosis:       Right thyroid nodule      (Right th

## 2022-01-17 NOTE — H&P
HISTORY OF PRESENT ILLNESS  She presents with a history of turning her neck and noting a large mass on the right side of her neck.  She states that she does not recall this being present over the last several months and that from her estimation this has gr accepts these risks and wishes to proceed.      Social History    Socioeconomic History      Marital status:     Tobacco Use      Smoking status: Never Smoker      Smokeless tobacco: Never Used      Tobacco comment: No Household Smokers.      Vaping Normal Orientation - Oriented to time, place, person & situation. Appropriate mood and affect.    Neck Exam Normal Inspection - Normal. Palpation - Normal. Parotid gland - Normal. Thyroid gland -large 3.5 centimeter nodule right hemithyroid   Eyes Normal Co Inject IM in event of  allergic reaction, Disp: 1 each, Rfl: 0  •  diphenhydrAMINE HCl (BENADRYL ALLERGY OR), Take by mouth., Disp: , Rfl:   •  methylPREDNISolone 4 MG Oral Tablet Therapy Pack, Use as directed with food x 6 days (Patient not taking: No sig

## 2022-01-17 NOTE — BRIEF OP NOTE
Pre-Operative Diagnosis: Right thyroid nodule [E04.1]     Post-Operative Diagnosis: Right thyroid nodule [E04.1]      Procedure Performed:   RIGHT THYROIDECTOMY    Surgeon(s) and Role:     * Pablito Devries MD - Primary     Mannie gNuyen MD - Assisting

## 2022-01-17 NOTE — INTERVAL H&P NOTE
Pre-op Diagnosis: Right thyroid nodule [E04.1]    The above referenced H&P was reviewed by Pascual Beard. Agnes Mike MD on 1/17/2022, the patient was examined and no significant changes have occurred in the patient's condition since the H&P was performed.   I discuss

## 2022-01-17 NOTE — OPERATIVE REPORT
[facility]    PATIENT'S NAME: Yonny Oliveira   ATTENDING PHYSICIAN: Leonides Dorado MD   OPERATING PHYSICIAN: Joslyn Maravilla.  Sharonda Dorado MD   PATIENT ACCOUNT#:   [id number]    LOCATION:  [facility]  MEDICAL RECORD #:   [id number]       DATE OF BI elevated off the tracheal surface, clamped at the isthmus, and tied with a silk and sent to Pathology in formalin for further evaluation. Any bleeding sites were controlled using bipolar cautery.   The wound was irrigated, and any bleeding sites were contr

## 2022-01-18 ENCOUNTER — TELEPHONE (OUTPATIENT)
Dept: OTOLARYNGOLOGY | Facility: CLINIC | Age: 42
End: 2022-01-18

## 2022-01-18 NOTE — TELEPHONE ENCOUNTER
Pt called stating pt had surgery yesterday 1-17-22. Pt calling to schedule post op appointment. Call transferred to the nurse.

## 2022-01-18 NOTE — TELEPHONE ENCOUNTER
Post op day 1 right hemithyroidectomy,pt stated she is doing fine no bleeding ,no fever ,dressing dry and intact,reviewed post op medications. Advised pt can remove the big dressing or Keyser Kunal dressing on Thursday as instructed but leave the steri strips

## 2022-01-25 ENCOUNTER — OFFICE VISIT (OUTPATIENT)
Dept: OTOLARYNGOLOGY | Facility: CLINIC | Age: 42
End: 2022-01-25
Payer: COMMERCIAL

## 2022-01-25 VITALS
DIASTOLIC BLOOD PRESSURE: 80 MMHG | TEMPERATURE: 98 F | SYSTOLIC BLOOD PRESSURE: 136 MMHG | WEIGHT: 263 LBS | BODY MASS INDEX: 44.9 KG/M2 | HEIGHT: 64 IN

## 2022-01-25 DIAGNOSIS — E04.1 THYROID NODULE: Primary | ICD-10-CM

## 2022-01-25 PROCEDURE — 3075F SYST BP GE 130 - 139MM HG: CPT | Performed by: OTOLARYNGOLOGY

## 2022-01-25 PROCEDURE — 3079F DIAST BP 80-89 MM HG: CPT | Performed by: OTOLARYNGOLOGY

## 2022-01-25 PROCEDURE — 99024 POSTOP FOLLOW-UP VISIT: CPT | Performed by: OTOLARYNGOLOGY

## 2022-01-25 PROCEDURE — 3008F BODY MASS INDEX DOCD: CPT | Performed by: OTOLARYNGOLOGY

## 2022-01-25 NOTE — PROGRESS NOTES
Nelda Torre is a 35year old female.   Patient presents with:  Post-Op: pt is here for post op of right thyroidectomy , pt has no complaints just some swelling       HISTORY OF PRESENT ILLNESS  She presents with a history of turning her neck and noting a complaints.     1/17/22 Doing well. Here for right hemithyroidectomy. Risks and benefits discussed once again specifically recurrent laryngeal nerve injury and voice changes. She accepts these risks and wishes to proceed.      1/25/22 8 days out from right changes. Respiratory Negative Dyspnea and wheezing. Cardio Negative Chest pain, irregular heartbeat/palpitations and syncope. GI Negative Abdominal pain and diarrhea. Endocrine Negative Cold intolerance and heat intolerance.    Neuro Negative Tremor cephalexin 500 MG Oral Cap, Take 1 capsule (500 mg total) by mouth every 8 (eight) hours. , Disp: 21 capsule, Rfl: 0  •  levocetirizine (XYZAL) 5 MG Oral Tab, Take 1 tablet (5 mg total) by mouth nightly.  (Patient taking differently: Take 5 mg by mouth in th

## 2022-02-06 RX ORDER — PREDNISONE 10 MG/1
TABLET ORAL
Qty: 15 TABLET | Refills: 0 | Status: SHIPPED | OUTPATIENT
Start: 2022-02-06 | End: 2022-04-02

## 2022-02-06 NOTE — PROGRESS NOTES
Patient patient may at Sunday morning at 9 AM.  Return call. Patient with episode of lip swelling and hives that started 2 days ago on Friday. Patient has been taking Xyzal 3 times per day and Benadryl as needed. Hives and lip swelling worsened with exposure to cold air yesterday evening. Patient increase Xyzal up to 4 times per day and Pepcid twice a day. With Benadryl as needed. No respiratory symptoms no oral swelling. Continue with above regimen including Xyzal 4 times per day Pepcid twice a day with Benadryl as needed. Start prednisone 30 mg once a day with food for 5 days. Reviewed Xolair as a treatment option as patient has had intermittent flares with her chronic urticaria and angioedema.   Recommended patient to contact my office for application for Xolair next week

## 2022-02-15 NOTE — TELEPHONE ENCOUNTER
Refill passed per [facility] protocol, but shows high dose warning. Please send, if appropriate      Requested Prescriptions   Pending Prescriptions Disp Refills    famotidine (PEPCID) 40 MG Oral Tab 90 tablet 0     Sig: Take 1 tablet (40 mg total) by mouth daily.         Gastrointestional Medication Protocol Passed - 2/15/2022  3:02 PM        Passed - Appointment in past 12 or next 3 months                Recent Outpatient Visits              3 weeks ago Thyroid nodule    [facility], Lyla Triplett Eloisa Gain, MD    Office Visit    1 month ago Pre-op evaluation    [facility], Telma Esqueda APRN    Office Visit    2 months ago Angioedema, subsequent encounter    [facility], Gerson SNELL MD    Telemedicine    2 months ago Thyroid nodule    [facility] [place], Lyla Triplett Eloisa Gain, MD    Office Visit    2 months ago Thyroid dysfunction    [address] [facility], Nona Carrington MD    Office Visit

## 2022-02-15 NOTE — TELEPHONE ENCOUNTER
Patients  calling on her behalf for a prescription refill to be sent to [facility] pharmacy.        Famotidine 40 mg

## 2022-02-16 RX ORDER — FAMOTIDINE 40 MG/1
40 TABLET, FILM COATED ORAL DAILY
Qty: 90 TABLET | Refills: 1 | Status: SHIPPED | OUTPATIENT
Start: 2022-02-16 | End: 2022-04-13

## 2022-02-16 RX ORDER — FAMOTIDINE 40 MG/1
40 TABLET, FILM COATED ORAL DAILY
Qty: 90 TABLET | Refills: 0 | Status: CANCELLED | OUTPATIENT
Start: 2022-02-16

## 2022-02-16 NOTE — TELEPHONE ENCOUNTER
Patient is checking status on the refill and states Dr. Jessie Koehler recommended the patient to take it 2x a day, states almost out of meds, please advise

## 2022-04-02 ENCOUNTER — TELEPHONE (OUTPATIENT)
Dept: ALLERGY | Facility: CLINIC | Age: 42
End: 2022-04-02

## 2022-04-02 RX ORDER — PREDNISONE 10 MG/1
TABLET ORAL
Qty: 15 TABLET | Refills: 0 | Status: SHIPPED | OUTPATIENT
Start: 2022-04-02

## 2022-04-02 NOTE — TELEPHONE ENCOUNTER
RN called pt back to offer virtual visit today. Unfortunately patient's PCP has left [facility] and cannot provide a referral as she has an HMO insurance plan. Pt does not want to risk having to pay for appointment out of pocket without referral.     Per Dr. Rodríguez Said, he would like pt to continue taking Prednisone and would like RN to place an order for script. Pt to take Prednisone 30mg (3 10mg tablets) a day for 5 days. Pt to complete entire course of Prednisone as ordered. Pt to complete Xolair Enrollment Form and will fill out those forms next week. Pt is aware to follow up in ER if having any difficulties breathing talking or swallowing. Follow up for 5/2 scheduled for patient's first Xolair injection. Pt is aware to get referral for visit and that she must wait for 2 hours post injection per Xolair administration protocol. RN placed order for Prednisone. Will await pt to complete Xolair Enrollment Forms next week.

## 2022-04-02 NOTE — TELEPHONE ENCOUNTER
Patient reports lip swelling---using extra Prednisone that Dr. Gavin Self had given her. Lip swelling started yesterday around 12pm  Unknown cause  Had some hives the day before on her wrist the day before, went away  Also had some underneath eye on Monday morning--took 3 Prednisone Monday and over the course of Tuesday, by Wednesday morning they were gone  Stopped Prednisone Wednessday morning, hives popped up on wrist late afternoon and then started having lip swelling yesterday afternoon. Restarted Prednisone yesterday (3) 10mg tabs spaced out throughout the day   Has been taking Xyzal QID and Pepcid BID daily to prevent hives--still occurring. She would like to start Xolair if possible. Pt wondering how long to take Prednisone   Has 4 tablets left. Pt denies any difficulties breathing, talking or swallowing. Denies distress. Is aware of when to follow up in ER. Routed to Dr. Gavin Self. Will contact pt with recs.

## 2022-04-02 NOTE — TELEPHONE ENCOUNTER
Call reviewed and noted. Discussed with nursing staff. Agreed as documented with my recommendations. Start prednisone 30 mg once a day with food x5 days continue with Xyzal 4 times per day. Pepcid twice per day and Benadryl as needed.   Recommend Xolair as patient has been refractory in the past and has had flares of her hives and angioedema, recommend for patient to  application for Xolair to complete if she is willing to proceed

## 2022-04-04 ENCOUNTER — TELEPHONE (OUTPATIENT)
Dept: INTERNAL MEDICINE CLINIC | Facility: CLINIC | Age: 42
End: 2022-04-04

## 2022-04-04 DIAGNOSIS — T78.40XD ALLERGY, SUBSEQUENT ENCOUNTER: Primary | ICD-10-CM

## 2022-04-04 NOTE — TELEPHONE ENCOUNTER
Patient is requesting referral for allergy department. Patient needs 2 separate referrals, one for provider Dr Deidra Berman and the other referral for allergy shots. Please advise. Please call patient when orders place.

## 2022-04-11 ENCOUNTER — TELEPHONE (OUTPATIENT)
Dept: ALLERGY | Facility: CLINIC | Age: 42
End: 2022-04-11

## 2022-04-11 NOTE — TELEPHONE ENCOUNTER
Pt was having acute allergic reaction with her allergies so she was given appt to see Dr Jeanette Lima for a separate visit for 4/13. Pt would need another referral for this visit.   Please advise

## 2022-04-11 NOTE — TELEPHONE ENCOUNTER
Call reviewed and noted. Continue with Xyzal 4 times per day, Pepcid twice a day. Continue with Benadryl every 4-6 hours as needed. In my opinion Barry Sevilla is the next logical treatment option for her. Patient has follow-up appointment this week.   May consider Xolair sample this week if needed

## 2022-04-11 NOTE — TELEPHONE ENCOUNTER
Call reviewed and noted. Agree with triage advice provided.   Patient has telemed appointment with me in 2 days and Xolair scheduled for next week

## 2022-04-11 NOTE — TELEPHONE ENCOUNTER
Pt reports April 2nd she had called over with angioedema. She had started prednisone at that time. Dr. Sunil Fox refilled prednisone and advised 30 mg x 5 days. Finished Wednesday April 6th  She completed that course. The hives and swelling went away and didn't return. This morning at work numbness started right above lip. Right cheek now numb, and swelling has just started. Inside of lip is a little puffy. No difficulty breathing talking or swallowing. Pt taking four xyzal and two pepcid daily. No benadryl at this time    Pt informed should any difficulty breathing, talking or swallowing occur she will need to present to ER immediately. Follow up scheduled for telemedicine visit on Wednesday morning. Pt in class until 315 today. Pt reports RN may leave a detailed message if called prior to then. RN to call back with any additional recommendations from Dr. Sunil Fox.

## 2022-04-11 NOTE — TELEPHONE ENCOUNTER
Discussed with patient. She has a virtual visit on Wednesday. RN advised patient she may add benadryl into her current regiment. If worsening symptoms occur prior to visit, she will need to seek urgent care. RN advised we are trying to avoid another burst of steroids. Pt advised if we can get her in for her first dose of Xolair sooner than her currently scheduled visit that would be ideal.   Patient to come for her first xolair injection with a two hour wait on Monday 4/18/2022. Pt will see the RN's. She does not need to be on Dr. Naz Casillas schedule for that visit, since she has a virtual appointment on Wednesday. RN again advised patient for worsening symptoms, to present to urgent care/ER. Patient agreeable to plan of care.

## 2022-04-13 ENCOUNTER — TELEMEDICINE (OUTPATIENT)
Dept: ALLERGY | Facility: CLINIC | Age: 42
End: 2022-04-13

## 2022-04-13 DIAGNOSIS — J30.1 SEASONAL ALLERGIC RHINITIS DUE TO POLLEN: ICD-10-CM

## 2022-04-13 DIAGNOSIS — L50.1 CHRONIC IDIOPATHIC URTICARIA: Primary | ICD-10-CM

## 2022-04-13 DIAGNOSIS — T78.3XXA ANGIOEDEMA, INITIAL ENCOUNTER: ICD-10-CM

## 2022-04-13 DIAGNOSIS — Z92.29 COVID-19 VACCINE SERIES COMPLETED: ICD-10-CM

## 2022-04-13 PROCEDURE — 99214 OFFICE O/P EST MOD 30 MIN: CPT | Performed by: ALLERGY & IMMUNOLOGY

## 2022-04-13 RX ORDER — HYDROXYZINE HYDROCHLORIDE 25 MG/1
25 TABLET, FILM COATED ORAL EVERY 4 HOURS PRN
Qty: 30 TABLET | Refills: 0 | Status: SHIPPED | OUTPATIENT
Start: 2022-04-13

## 2022-04-13 RX ORDER — MONTELUKAST SODIUM 10 MG/1
10 TABLET ORAL NIGHTLY
Qty: 90 TABLET | Refills: 1 | Status: SHIPPED | OUTPATIENT
Start: 2022-04-13

## 2022-04-13 RX ORDER — PREDNISONE 10 MG/1
TABLET ORAL
Qty: 38 TABLET | Refills: 0 | Status: SHIPPED | OUTPATIENT
Start: 2022-04-13

## 2022-04-13 RX ORDER — FAMOTIDINE 40 MG/1
40 TABLET, FILM COATED ORAL DAILY
Qty: 180 TABLET | Refills: 1 | Status: SHIPPED | OUTPATIENT
Start: 2022-04-13

## 2022-04-13 NOTE — PATIENT INSTRUCTIONS
#1 chronic idiopathic urticaria/angioedema  Recent flare of the past week in spite of Xyzal 4 times per day Pepcid twice a day  Patient took prednisone 30 mg once a day with some improvement. No ED visits or EpiPen usage in the interim. EpiPen is up-to-date  Prior C4 level was normal back in fall 2021    Recs: Start prednisone 40 mg once a day with food x5 days if symptoms have resolved and may stop prednisone after 5 days. If symptoms still persist then complete the 2-week taper including 30 mg a day x3 days then 20 mg a days x3 days then 10 mg once a day x3 days  Continue with Pepcid twice a day  Continue with Xyzal 5 mg 4 times per day  Start Singulair montelukast 10 mg a day  Add hydroxyzine 25 mg every 4-6 hours the event of flares. May cause sedation but not recommend alcohol or driving while on hydroxyzine  Start Xolair next week  Check C1 esterase inhibitor level and tryptase level    #2 allergic rhinitis  Reviewed avoidance measures and potential treatment option of immunotherapy. Prior serum IgE testing showed to cockroach and ragweed  Xyzal as an antihistamine. May add Flonase 2 sprays per nostril once a day if having prominent nasal congestion or postnasal drip    #3 COVID vaccination up-to-date x2 doses.   Recommend booster once her hives are more controlled

## 2022-04-15 ENCOUNTER — LAB ENCOUNTER (OUTPATIENT)
Dept: LAB | Facility: HOSPITAL | Age: 42
End: 2022-04-15
Attending: ALLERGY & IMMUNOLOGY
Payer: COMMERCIAL

## 2022-04-15 DIAGNOSIS — T78.3XXA ANGIOEDEMA, INITIAL ENCOUNTER: ICD-10-CM

## 2022-04-15 DIAGNOSIS — L50.1 CHRONIC IDIOPATHIC URTICARIA: ICD-10-CM

## 2022-04-15 PROCEDURE — 36415 COLL VENOUS BLD VENIPUNCTURE: CPT

## 2022-04-15 PROCEDURE — 83520 IMMUNOASSAY QUANT NOS NONAB: CPT

## 2022-04-15 PROCEDURE — 86160 COMPLEMENT ANTIGEN: CPT

## 2022-04-15 PROCEDURE — 86161 COMPLEMENT/FUNCTION ACTIVITY: CPT

## 2022-04-18 ENCOUNTER — NURSE ONLY (OUTPATIENT)
Dept: ALLERGY | Facility: CLINIC | Age: 42
End: 2022-04-18
Payer: COMMERCIAL

## 2022-04-18 ENCOUNTER — TELEPHONE (OUTPATIENT)
Dept: ALLERGY | Facility: CLINIC | Age: 42
End: 2022-04-18

## 2022-04-18 VITALS
OXYGEN SATURATION: 97 % | SYSTOLIC BLOOD PRESSURE: 138 MMHG | RESPIRATION RATE: 18 BRPM | HEART RATE: 89 BPM | DIASTOLIC BLOOD PRESSURE: 86 MMHG

## 2022-04-18 DIAGNOSIS — L50.1 CHRONIC IDIOPATHIC URTICARIA: ICD-10-CM

## 2022-04-18 LAB — TRYPTASE: 4.7 UG/L

## 2022-04-18 PROCEDURE — 3079F DIAST BP 80-89 MM HG: CPT | Performed by: ALLERGY & IMMUNOLOGY

## 2022-04-18 PROCEDURE — 96372 THER/PROPH/DIAG INJ SC/IM: CPT | Performed by: ALLERGY & IMMUNOLOGY

## 2022-04-18 PROCEDURE — 3075F SYST BP GE 130 - 139MM HG: CPT | Performed by: ALLERGY & IMMUNOLOGY

## 2022-04-18 RX ORDER — PREDNISONE 10 MG/1
TABLET ORAL
Qty: 38 TABLET | Refills: 0 | Status: SHIPPED | OUTPATIENT
Start: 2022-04-18

## 2022-04-18 NOTE — TELEPHONE ENCOUNTER
Patient in office today for first Xolair injections. She tolerated well. Before discharging home she wanted to clarify whether Dr. Denver Miller thought she needed an extra prednisone taper on hand just in case she breaks out in hives in the future. She said that it was discussed during her last office visit on 4/13/22. She is currently working through her taper pack that was ordered on 4/13/22 and plans to finish it as ordred. RN notified her that she will ask Dr. Denver Miller and notify her of Dr. John Ruelas response.

## 2022-04-18 NOTE — TELEPHONE ENCOUNTER
Dr. Fredy Kimball when you get a chance would you be able to add an order for pt's Xolair in the [facility]? She is getting 300mg Q28 days for CIU. Thank you.

## 2022-04-21 ENCOUNTER — TELEPHONE (OUTPATIENT)
Dept: ALLERGY | Facility: CLINIC | Age: 42
End: 2022-04-21

## 2022-04-21 LAB
C-1-ESTERASE INHIBITOR, FUNCTI: 111 %
C-1-ESTERASE INHIBITOR: 31 MG/DL
COMPLEMENT COMPONENT 4: 27 MG/DL

## 2022-04-21 NOTE — TELEPHONE ENCOUNTER
----- Message from Ana Correa MD sent at 4/19/2022  8:08 AM CDT -----     Please call patient with normal tryptase level of 4.7

## 2022-04-21 NOTE — TELEPHONE ENCOUNTER
----- Message from Marce Mauro MD sent at 4/21/2022  7:50 AM CDT -----  Please call patient with negative/normal C1 esterase inhibitor panel.   This is good news

## 2022-05-17 ENCOUNTER — OFFICE VISIT (OUTPATIENT)
Dept: ALLERGY | Facility: CLINIC | Age: 42
End: 2022-05-17
Payer: COMMERCIAL

## 2022-05-17 ENCOUNTER — NURSE ONLY (OUTPATIENT)
Dept: ALLERGY | Facility: CLINIC | Age: 42
End: 2022-05-17
Payer: COMMERCIAL

## 2022-05-17 DIAGNOSIS — Z92.29 COVID-19 VACCINE SERIES COMPLETED: ICD-10-CM

## 2022-05-17 DIAGNOSIS — J30.1 SEASONAL ALLERGIC RHINITIS DUE TO POLLEN: ICD-10-CM

## 2022-05-17 DIAGNOSIS — L50.1 CHRONIC IDIOPATHIC URTICARIA: Primary | ICD-10-CM

## 2022-05-17 DIAGNOSIS — L50.1 IDIOPATHIC URTICARIA: ICD-10-CM

## 2022-05-17 DIAGNOSIS — T78.3XXA ANGIOEDEMA, INITIAL ENCOUNTER: ICD-10-CM

## 2022-05-17 PROCEDURE — 96372 THER/PROPH/DIAG INJ SC/IM: CPT | Performed by: ALLERGY & IMMUNOLOGY

## 2022-05-17 PROCEDURE — 99214 OFFICE O/P EST MOD 30 MIN: CPT | Performed by: ALLERGY & IMMUNOLOGY

## 2022-05-17 NOTE — PROGRESS NOTES
Xolair Progress Note:     See \"vitals\" flow sheet for vital sign detail. See MAR for injection detail. Per standing order pt to continue Xolair injections every 4  weeks 300 mg. PT verified name, , and injection to be given. Xolair 150 mg SC given upper right and left arms at 1603. Samples used. Pt defers education at this time. Xolair Exp: 2022  Xolair Lot #[id number]    Patient Status: Patient d/c'd home 30 minutes s/p Xolair injections for idopathic urticaria. Patient  tolerates injection without complications. Site WNL. No further questions or concerns at this time. Return to clinic for next injection as advised by Dr. James Yang.      Time d/c to home [phone]  Next appointment scheduled for: 2022

## 2022-05-17 NOTE — PATIENT INSTRUCTIONS
#1 chronic idiopathic urticaria/angioedema  Hive free over the past 4 weeks. Off prednisone for the past 3 weeks. Xolair dose #2 today 300 mg every 4 weeks followed by 30 minutes of observation peer without issue or incident  Reviewed Xolair would not be just as far as efficacy until least after the first 3 doses initially. Continue with Xyzal 5 mg 4 times per day, Pepcid twice a day, singular once a day. May add hydroxyzine if refractory. #2 allergic rhinitis  Continue with Xyzal once a day continue with singular once a day. Reviewed avoidance measures and potential treatment option of immunotherapy. May add Flonase or Nasacort 2 sprays per nostril once a day if having prominent nasal congestion postnasal drip      #3 COVID vaccinations x2 doses.   Recommend booster        Follow-up in 1 month for next Xolair dose

## 2022-05-18 ENCOUNTER — PATIENT MESSAGE (OUTPATIENT)
Dept: ALLERGY | Facility: CLINIC | Age: 42
End: 2022-05-18

## 2022-05-24 RX ORDER — OMALIZUMAB 150 MG/ML
300 INJECTION, SOLUTION SUBCUTANEOUS
Qty: 6 ML | Refills: 1 | Status: SHIPPED | OUTPATIENT
Start: 2022-05-24

## 2022-06-01 ENCOUNTER — TELEPHONE (OUTPATIENT)
Dept: ALLERGY | Facility: CLINIC | Age: 42
End: 2022-06-01

## 2022-06-01 NOTE — TELEPHONE ENCOUNTER
[facility] is confirming patients xolair delivery and  requesting to know if any allergies need to be reported. They will not deliver package until updated.

## 2022-06-14 ENCOUNTER — NURSE ONLY (OUTPATIENT)
Dept: ALLERGY | Facility: CLINIC | Age: 42
End: 2022-06-14
Payer: COMMERCIAL

## 2022-06-14 ENCOUNTER — OFFICE VISIT (OUTPATIENT)
Dept: ALLERGY | Facility: CLINIC | Age: 42
End: 2022-06-14
Payer: COMMERCIAL

## 2022-06-14 VITALS
SYSTOLIC BLOOD PRESSURE: 147 MMHG | TEMPERATURE: 99 F | DIASTOLIC BLOOD PRESSURE: 84 MMHG | WEIGHT: 263 LBS | HEIGHT: 64 IN | OXYGEN SATURATION: 98 % | HEART RATE: 96 BPM | BODY MASS INDEX: 44.9 KG/M2

## 2022-06-14 DIAGNOSIS — Z92.29 COVID-19 VACCINE SERIES COMPLETED: ICD-10-CM

## 2022-06-14 DIAGNOSIS — L50.1 IDIOPATHIC URTICARIA: ICD-10-CM

## 2022-06-14 DIAGNOSIS — L50.1 CHRONIC IDIOPATHIC URTICARIA: Primary | ICD-10-CM

## 2022-06-14 DIAGNOSIS — T78.3XXA ANGIOEDEMA, INITIAL ENCOUNTER: ICD-10-CM

## 2022-06-14 DIAGNOSIS — J30.1 SEASONAL ALLERGIC RHINITIS DUE TO POLLEN: ICD-10-CM

## 2022-06-14 PROCEDURE — 3077F SYST BP >= 140 MM HG: CPT | Performed by: ALLERGY & IMMUNOLOGY

## 2022-06-14 PROCEDURE — 3008F BODY MASS INDEX DOCD: CPT | Performed by: ALLERGY & IMMUNOLOGY

## 2022-06-14 PROCEDURE — 96372 THER/PROPH/DIAG INJ SC/IM: CPT | Performed by: ALLERGY & IMMUNOLOGY

## 2022-06-14 PROCEDURE — 3079F DIAST BP 80-89 MM HG: CPT | Performed by: ALLERGY & IMMUNOLOGY

## 2022-06-14 PROCEDURE — 99214 OFFICE O/P EST MOD 30 MIN: CPT | Performed by: ALLERGY & IMMUNOLOGY

## 2022-06-22 ENCOUNTER — TELEPHONE (OUTPATIENT)
Dept: ALLERGY | Facility: CLINIC | Age: 42
End: 2022-06-22

## 2022-06-22 NOTE — TELEPHONE ENCOUNTER
[facility] is requesting a call to schedule delivery of patient's xolair prescription.  Please call [phone] to schedule delivery

## 2022-06-25 ENCOUNTER — PATIENT MESSAGE (OUTPATIENT)
Dept: ALLERGY | Facility: CLINIC | Age: 42
End: 2022-06-25

## 2022-06-27 ENCOUNTER — TELEPHONE (OUTPATIENT)
Dept: ALLERGY | Facility: CLINIC | Age: 42
End: 2022-06-27

## 2022-06-27 RX ORDER — FAMOTIDINE 40 MG/1
40 TABLET, FILM COATED ORAL 2 TIMES DAILY
Qty: 180 TABLET | Refills: 0 | Status: SHIPPED | OUTPATIENT
Start: 2022-06-27

## 2022-06-27 NOTE — TELEPHONE ENCOUNTER
Dr. Oswaldo Gonzalez please advise on refill request.   Pt reports she is taking Pepcid 40 mg BID per your instructions. Last prescribed for once daily. Rx pended for BID dosing.

## 2022-06-27 NOTE — TELEPHONE ENCOUNTER
[facility] contacted at [phone], spoke with pharmacy , Maryse Nash. Xolair 150 mg/mL prefilled syringe x2 delivery date: 7/6/2022    Next Xolair Injection Visit: 7/12/2022.

## 2022-07-07 NOTE — TELEPHONE ENCOUNTER
Shipment of Xoliar 150mg syringe PF (x2) received on 7/6/22, placed in med fridge. Added to tracking spreadsheet.

## 2022-07-12 ENCOUNTER — NURSE ONLY (OUTPATIENT)
Dept: ALLERGY | Facility: CLINIC | Age: 42
End: 2022-07-12
Payer: COMMERCIAL

## 2022-07-12 VITALS
TEMPERATURE: 98 F | SYSTOLIC BLOOD PRESSURE: 123 MMHG | DIASTOLIC BLOOD PRESSURE: 86 MMHG | HEART RATE: 77 BPM | OXYGEN SATURATION: 97 %

## 2022-07-12 DIAGNOSIS — L50.1 CHRONIC IDIOPATHIC URTICARIA: ICD-10-CM

## 2022-07-12 RX ORDER — MONTELUKAST SODIUM 10 MG/1
10 TABLET ORAL NIGHTLY
Qty: 90 TABLET | Refills: 0 | Status: SHIPPED | OUTPATIENT
Start: 2022-07-12

## 2022-07-12 NOTE — PROGRESS NOTES
Per standing order patient to continue Xolair injections every 28 days- 300 mg. Patient declined education for self administration of Xolair in the home setting. Patient verified name, , and injection to be given. Xolair 150 mg/mL prefilled syringe x2  NDC #: [id number]  Lot #: [id number]  Exp Date: 2023    Xolair 150 mg/mL prefilled syringe administered subcutaneously to Right and Left Upper Arms. Patient tolerated injection with no adverse side effects noted at time of administration. ? Patient monitored x 30 min after administration. At 1106, patient released from office without any sign/symptoms of local or systemic reaction. ? Patient scheduled next Biologic Injection Appointment for 2022 at 1020.

## 2022-07-14 RX ORDER — OMALIZUMAB 150 MG/ML
300 INJECTION, SOLUTION SUBCUTANEOUS
Qty: 2 EACH | Refills: 2 | Status: SHIPPED | OUTPATIENT
Start: 2022-07-14

## 2022-07-18 ENCOUNTER — TELEPHONE (OUTPATIENT)
Dept: CASE MANAGEMENT | Age: 42
End: 2022-07-18

## 2022-07-18 ENCOUNTER — TELEPHONE (OUTPATIENT)
Dept: INTERNAL MEDICINE CLINIC | Facility: CLINIC | Age: 42
End: 2022-07-18

## 2022-07-18 DIAGNOSIS — T78.40XD ALLERGY, SUBSEQUENT ENCOUNTER: Primary | ICD-10-CM

## 2022-07-18 NOTE — TELEPHONE ENCOUNTER
Patient is requesting referral.     Name of specialist and specialty department : Dr. Ian Henry, Allergist  Reason for visit with the specialist: Xolair shots   Address of the specialist office: [place] [address]  Appointment date: 8/9/2022     Patient requesting 12 visits for chronic idiopathetic urticaria    CSS informed patient the turnaround time for referral is 5-7 business days. Patient was informed to check their [employer] account for referral status.

## 2022-07-26 ENCOUNTER — TELEPHONE (OUTPATIENT)
Dept: ALLERGY | Facility: CLINIC | Age: 42
End: 2022-07-26

## 2022-07-26 RX ORDER — OMALIZUMAB 150 MG/ML
INJECTION, SOLUTION SUBCUTANEOUS
Qty: 2 EACH | Refills: 2 | OUTPATIENT
Start: 2022-07-26

## 2022-07-26 NOTE — TELEPHONE ENCOUNTER
[facility] contacted at [phone], spoke with representative, Jose J. Xolair 150 mg/mL prefilled syringe x2 scheduled for delivery: 7/27/2022    Patient's next Xolair injection appt: 8/9/2022.

## 2022-07-26 NOTE — TELEPHONE ENCOUNTER
[facility] contacted at [phone], spoke with representative, Jose J. Inquired of rep if Xolair Rx from 7/14/2022 had been received by [facility]. Rep offers that Rx was received as above and ok to deny current Xolair refill request.     Rx denied.

## 2022-07-27 ENCOUNTER — PATIENT MESSAGE (OUTPATIENT)
Dept: ALLERGY | Facility: CLINIC | Age: 42
End: 2022-07-27

## 2022-07-27 NOTE — TELEPHONE ENCOUNTER
From: Zandra Bustillos  Sent: 7/27/2022 10:42 AM CDT  To: [facility] Clinical Staff  Subject: Xolair prescription renewal denied message    OK, thank you so much!! Have a great day!! :)

## 2022-08-09 ENCOUNTER — NURSE ONLY (OUTPATIENT)
Dept: ALLERGY | Facility: CLINIC | Age: 42
End: 2022-08-09
Payer: COMMERCIAL

## 2022-08-09 VITALS
SYSTOLIC BLOOD PRESSURE: 130 MMHG | DIASTOLIC BLOOD PRESSURE: 62 MMHG | TEMPERATURE: 97 F | HEART RATE: 83 BPM | OXYGEN SATURATION: 99 %

## 2022-08-09 DIAGNOSIS — L50.1 CHRONIC IDIOPATHIC URTICARIA: ICD-10-CM

## 2022-08-09 PROCEDURE — 3078F DIAST BP <80 MM HG: CPT | Performed by: ALLERGY & IMMUNOLOGY

## 2022-08-09 PROCEDURE — 3075F SYST BP GE 130 - 139MM HG: CPT | Performed by: ALLERGY & IMMUNOLOGY

## 2022-08-09 PROCEDURE — 96372 THER/PROPH/DIAG INJ SC/IM: CPT | Performed by: ALLERGY & IMMUNOLOGY

## 2022-08-09 NOTE — PROGRESS NOTES
Per standing order patient to continue Xolair injections every 4 weeks- 300 mg. Patient verified name, , and injection to be given. At, Xolair 300 mg administered subcutaneously to bilateral upper arms. Patient tolerated injection with no adverse side effects noted at time of administration. Xolair 150 mg/mL prefilled syringe x2  Lot #: [id number] x3  Exp Date: 04/2023 x2    Patient monitored x 30 min after administration. At [phone], patient released from office without any sign/symptoms of local or systemic reaction. ?   Patient scheduled next Biologic Injection Appointment at date 22

## 2022-08-16 ENCOUNTER — TELEPHONE (OUTPATIENT)
Dept: ALLERGY | Facility: CLINIC | Age: 42
End: 2022-08-16

## 2022-08-16 NOTE — TELEPHONE ENCOUNTER
Shalini Gar from [facility]) is calling to set up delivery for patient for medication -Omalizumab Woodford Leann) 150 MG/ML Subcutaneous Solution Prefilled Syringe

## 2022-08-23 NOTE — TELEPHONE ENCOUNTER
[facility] contacted at [phone]. Spoke with pharmacy service rep, Catalina Nichols. Xolair 150 mg/mL prefilled syringe x2 scheduled for delivery: 8/30/2022. Scheduled Xolair Injection: 9/6/2022.

## 2022-09-06 ENCOUNTER — NURSE ONLY (OUTPATIENT)
Dept: ALLERGY | Facility: CLINIC | Age: 42
End: 2022-09-06
Payer: COMMERCIAL

## 2022-09-06 VITALS
HEART RATE: 82 BPM | DIASTOLIC BLOOD PRESSURE: 82 MMHG | BODY MASS INDEX: 46.95 KG/M2 | OXYGEN SATURATION: 97 % | SYSTOLIC BLOOD PRESSURE: 128 MMHG | WEIGHT: 275 LBS | HEIGHT: 64 IN

## 2022-09-06 DIAGNOSIS — L50.1 CHRONIC IDIOPATHIC URTICARIA: ICD-10-CM

## 2022-09-06 NOTE — PROGRESS NOTES
Xolair Progress Note:     See \"vitals\" flow sheet for vital sign detail. See MAR for injection detail. Per standing order pt to continue Xolair injections every 4  weeks 300 mg. PT verified name, , and injection to be given. Xolair 150 mg SC given upper right and left arms at 1655. Xolair Exp:2023  Xolair Lot #[id number]    Patient Status: Patient d/c'd home 30 minutes s/p Xolair injections for idopathic urticaria. Patient  tolerates injection without complications. Site WNL. No further questions or concerns at this time. Return to clinic for next injection as advised by Dr. Demarco Fung.      Time d/c to home 3442  Next appointment scheduled for: 10/4/2022

## 2022-09-19 RX ORDER — FAMOTIDINE 40 MG/1
TABLET, FILM COATED ORAL
Qty: 180 TABLET | Refills: 0 | Status: SHIPPED | OUTPATIENT
Start: 2022-09-19

## 2022-10-04 ENCOUNTER — TELEPHONE (OUTPATIENT)
Dept: ALLERGY | Facility: CLINIC | Age: 42
End: 2022-10-04

## 2022-10-04 ENCOUNTER — NURSE ONLY (OUTPATIENT)
Dept: ALLERGY | Facility: CLINIC | Age: 42
End: 2022-10-04
Payer: COMMERCIAL

## 2022-10-04 VITALS
SYSTOLIC BLOOD PRESSURE: 136 MMHG | DIASTOLIC BLOOD PRESSURE: 87 MMHG | HEART RATE: 90 BPM | RESPIRATION RATE: 18 BRPM | OXYGEN SATURATION: 96 %

## 2022-10-04 DIAGNOSIS — L50.1 CHRONIC IDIOPATHIC URTICARIA: ICD-10-CM

## 2022-10-04 RX ORDER — MONTELUKAST SODIUM 10 MG/1
10 TABLET ORAL NIGHTLY
Qty: 90 TABLET | Refills: 0 | Status: SHIPPED | OUTPATIENT
Start: 2022-10-04

## 2022-10-04 RX ORDER — EPINEPHRINE 0.3 MG/.3ML
INJECTION SUBCUTANEOUS
Qty: 2 EACH | Refills: 0 | Status: SHIPPED | OUTPATIENT
Start: 2022-10-04

## 2022-10-04 NOTE — TELEPHONE ENCOUNTER
Maurice Israel from [employer] is calling to arrange delivery of Xolair to Dr. Radu Chavez office. Any questions, please call [phone]. Please advise. Spoke with mom. Mom states that she hasn't been able to collect a stool specimen for h pylori because patient hasn't had a BM; asking if okay to give her some Miralax. Mom informed that it is okay to give a little Miralax (1/2 capful) or a stool softener to help her go. Mom verbalized understanding.

## 2022-10-04 NOTE — TELEPHONE ENCOUNTER
Pt in office for follow up. Requests needs for refill on epipen and singulair. Sent to pharmacy per protocol.

## 2022-10-04 NOTE — PROGRESS NOTES
Xolair Progress Note:     Pre-treatment Peak Flow: NA  Post treatment Peak Flow: NA  See \"vitals\" flow sheet for vital sign detail. See MAR for injection detail. Per standing order pt to continue Xolair injections every 4  weeks 300mg. PT verified name, , and injection to be given. Xolair 150 mg SC given upper right and left arms at 1618 . LOT#: [id number]  EXP: 2023        Patient Status: Patient d/c'd home 30  minutes s/p Xolair injections for idopathic urticaria. Patient  tolerates injection without complications. Site WNL. No further questions or concerns at this time. Return to clinic for next injection as advised by Dr. Demarco Fung.      Time d/c to home: 1648    Next appointment scheduled for: 10/31/2022

## 2022-10-04 NOTE — TELEPHONE ENCOUNTER
Spoke with Zunilda David from [facility]. Xolair 150 mg/mL prefilled syringe x2 scheduled for delivery on 10/6/2022. Next Xolair injection appt scheduled for 10/31/2022.

## 2022-10-13 ENCOUNTER — TELEPHONE (OUTPATIENT)
Dept: ALLERGY | Facility: CLINIC | Age: 42
End: 2022-10-13

## 2022-10-13 RX ORDER — OMALIZUMAB 150 MG/ML
300 INJECTION, SOLUTION SUBCUTANEOUS
Qty: 2 EACH | Refills: 6 | COMMUNITY
Start: 2022-10-13

## 2022-10-13 NOTE — TELEPHONE ENCOUNTER
Fax received from [facility]. Requesting refills on Xolair 300 mg every 28 days. Dr. Jessie Koehler authorized prescription request with 6 refills. Patient with appointment to see him on 10/31/2022. Faxed back to [phone]. Successful fax transmission recieved  Form sent to scan into this encounter.

## 2022-10-25 RX ORDER — OMALIZUMAB 150 MG/ML
INJECTION, SOLUTION SUBCUTANEOUS
Qty: 2 EACH | Refills: 2 | Status: SHIPPED | OUTPATIENT
Start: 2022-10-25

## 2022-10-31 ENCOUNTER — NURSE ONLY (OUTPATIENT)
Dept: ALLERGY | Facility: CLINIC | Age: 42
End: 2022-10-31
Payer: COMMERCIAL

## 2022-10-31 ENCOUNTER — OFFICE VISIT (OUTPATIENT)
Dept: ALLERGY | Facility: CLINIC | Age: 42
End: 2022-10-31
Payer: COMMERCIAL

## 2022-10-31 VITALS
BODY MASS INDEX: 46.95 KG/M2 | SYSTOLIC BLOOD PRESSURE: 132 MMHG | WEIGHT: 275 LBS | HEIGHT: 64 IN | OXYGEN SATURATION: 95 % | DIASTOLIC BLOOD PRESSURE: 80 MMHG | HEART RATE: 90 BPM

## 2022-10-31 DIAGNOSIS — J30.1 SEASONAL ALLERGIC RHINITIS DUE TO POLLEN: ICD-10-CM

## 2022-10-31 DIAGNOSIS — Z51.81 VISIT FOR MONITORING XOLAIR THERAPY: ICD-10-CM

## 2022-10-31 DIAGNOSIS — Z79.899 VISIT FOR MONITORING XOLAIR THERAPY: ICD-10-CM

## 2022-10-31 DIAGNOSIS — Z92.29 COVID-19 VACCINE SERIES COMPLETED: ICD-10-CM

## 2022-10-31 DIAGNOSIS — L50.1 IDIOPATHIC URTICARIA: ICD-10-CM

## 2022-10-31 DIAGNOSIS — Z23 FLU VACCINE NEED: ICD-10-CM

## 2022-10-31 DIAGNOSIS — T78.3XXA ANGIOEDEMA, INITIAL ENCOUNTER: ICD-10-CM

## 2022-10-31 DIAGNOSIS — L50.1 CHRONIC IDIOPATHIC URTICARIA: Primary | ICD-10-CM

## 2022-10-31 PROCEDURE — 3079F DIAST BP 80-89 MM HG: CPT | Performed by: ALLERGY & IMMUNOLOGY

## 2022-10-31 PROCEDURE — 3075F SYST BP GE 130 - 139MM HG: CPT | Performed by: ALLERGY & IMMUNOLOGY

## 2022-10-31 PROCEDURE — 3008F BODY MASS INDEX DOCD: CPT | Performed by: ALLERGY & IMMUNOLOGY

## 2022-10-31 PROCEDURE — 99214 OFFICE O/P EST MOD 30 MIN: CPT | Performed by: ALLERGY & IMMUNOLOGY

## 2022-10-31 NOTE — PROGRESS NOTES
Patient presented to the office for an office visit for chronic hives and Xolair injections. See office visit notes and vitals form today,10/31/22. Patient was given Xolair 150 mg left upper arm subcutaneously. Patient was given Xolair 150 mg right upper arm subcutaneously. Patient was observed in the office for 30 minutes after injections and was reassessed and was released from the office without complications. Per Dr. Gavin Self patient will step down to Xolair 150mg at next injection visit in November 2022.     Marcy Cui- SHANDA#[id number]            EXP.09/2023            [address] [id number]

## 2022-10-31 NOTE — PATIENT INSTRUCTIONS
#1 chronic idiopathic urticaria  Great response to Xolair. Started Xolair in April 2022. No hives since starting Xolair. No angioedema. No ED visits or EpiPen usage. Currently on 300 mg every 4 weeks, Xyzal once a day Pepcid once a day and singular once a day  Will attempt to decrease Xolair to 150 mg every 4 weeks  If patient tolerates decreasing Xolair to 150 the May try stopping Pepcid and Singulair  Continue with Xyzal once a day up to 4 times per day if needed    #2 allergic rhinitis  Continue with Xyzal and Singulair. Reviewed avoidance measures. Reviewed potential treatment option of immunotherapy    #3 COVID vaccines up-to-date x3 doses.   Recommend booster    #4 flu vaccine up-to-date    Follow-up in 6 months or sooner if needed    Patient observed in office for 30 minutes today without issue or incident

## 2022-11-03 ENCOUNTER — TELEPHONE (OUTPATIENT)
Dept: ALLERGY | Facility: CLINIC | Age: 42
End: 2022-11-03

## 2022-11-03 NOTE — TELEPHONE ENCOUNTER
[facility] contacted at [phone], spoke with pharmacy service rep, Huan Miramontes. Xolair 150 mg/mL prefilled syringe x2 scheduled for delivery: 11/10/2022.     Next Xolair Injection appointment: 11/28/2022

## 2022-11-10 NOTE — TELEPHONE ENCOUNTER
1900: Per Dr. Darya ÁLVAREZ to re check HGB with AM lab work. Updated primary RN Reji Chicas. Xolair 150 mg/mL prefilled syringe x2 received and placed in medication refrigerator.

## 2022-11-28 ENCOUNTER — TELEPHONE (OUTPATIENT)
Dept: ALLERGY | Facility: CLINIC | Age: 42
End: 2022-11-28

## 2022-11-28 ENCOUNTER — NURSE ONLY (OUTPATIENT)
Dept: ALLERGY | Facility: CLINIC | Age: 42
End: 2022-11-28
Payer: COMMERCIAL

## 2022-11-28 VITALS
TEMPERATURE: 98 F | OXYGEN SATURATION: 97 % | SYSTOLIC BLOOD PRESSURE: 124 MMHG | RESPIRATION RATE: 20 BRPM | DIASTOLIC BLOOD PRESSURE: 81 MMHG | HEART RATE: 83 BPM

## 2022-11-28 DIAGNOSIS — L50.1 CHRONIC IDIOPATHIC URTICARIA: Primary | ICD-10-CM

## 2022-11-28 DIAGNOSIS — L50.1 CHRONIC IDIOPATHIC URTICARIA: ICD-10-CM

## 2022-11-28 RX ORDER — MULTIVIT-MIN/IRON FUM/FOLIC AC 7.5 MG-4
1 TABLET ORAL DAILY
COMMUNITY

## 2022-11-28 NOTE — TELEPHONE ENCOUNTER
Dr. Tanya Lee, may I please have an in-clinic order for Xolair 150 mg prefilled syringe every 28 days?

## 2022-11-28 NOTE — PROGRESS NOTES
Per standing order patient to continue Xolair injections every 4 weeks- 150 mg. Patient verified name, , and injection to be given. At 1614, Xolair 150 mg/ mL prefilled syringe administered subcutaneously to Left Upper Arm. Patient tolerated injection with no adverse side effects noted at time of administration. Xolair 150 mg/mL prefilled syringe x1  NDC# 89028-681-02  Lot #: [id number]  Exp Date: 10/2023      ? Patient monitored x 30 min after administration. At .91.27., patient released from office without any sign/symptoms of local or systemic reaction. Pre PF: (3 readings) for asthma diagnosis None ordered  Post PF: (3 readings)  ?   Patient scheduled next Biologic Injection Appointment for 2022 at 10 am.

## 2022-12-27 ENCOUNTER — NURSE ONLY (OUTPATIENT)
Dept: ALLERGY | Facility: CLINIC | Age: 42
End: 2022-12-27
Payer: COMMERCIAL

## 2022-12-27 VITALS
TEMPERATURE: 97 F | DIASTOLIC BLOOD PRESSURE: 83 MMHG | HEART RATE: 78 BPM | SYSTOLIC BLOOD PRESSURE: 124 MMHG | OXYGEN SATURATION: 98 % | RESPIRATION RATE: 20 BRPM

## 2022-12-27 DIAGNOSIS — L50.1 CHRONIC IDIOPATHIC URTICARIA: ICD-10-CM

## 2022-12-27 PROCEDURE — 3079F DIAST BP 80-89 MM HG: CPT | Performed by: ALLERGY & IMMUNOLOGY

## 2022-12-27 PROCEDURE — 96372 THER/PROPH/DIAG INJ SC/IM: CPT | Performed by: ALLERGY & IMMUNOLOGY

## 2022-12-27 PROCEDURE — 3074F SYST BP LT 130 MM HG: CPT | Performed by: ALLERGY & IMMUNOLOGY

## 2022-12-27 NOTE — PROGRESS NOTES
Per standing order patient to continue Xolair injections every 28 days- 150  mg. Patient verified name, , and injection to be given. Xolair 150 mg/mL prefilled syringe x1  NDC: [id number]  Lot #: [id number]  Exp Date: 2023    At 0951, Xolair 150 mg/mL prefilled syringe administered subcutaneously to Left Upper Arm. Patient tolerated injection with no adverse side effects noted at time of administration. ? Patient monitored x 30 min after administration. At 1022, patient released from office without any sign/symptoms of local or systemic reaction. Pre PF: (3 readings) for asthma diagnosis  Post PF: (3 readings)  ? Patient scheduled next Biologic Injection Appointment for 2023 at 1630.

## 2023-01-17 ENCOUNTER — TELEPHONE (OUTPATIENT)
Dept: ALLERGY | Facility: CLINIC | Age: 43
End: 2023-01-17

## 2023-01-17 NOTE — TELEPHONE ENCOUNTER
[facility] contacted at [phone]. RN spoke with pharmacy service rep, Hazel Osgood. Attempted to schedule delivery of Xolair to physician's office. [place] unable to schedule delivery of med until patient has given permission to ship for the new year of 2023. Rep attempted to reach patient. Voicemail was left by rep asking that she call back [place] regarding needing permission to ship Xolair to the physician's office. Next Xolair injection appt: 1/24/2023. Message left on patient's voicemail with same above information. Asked patient to call [place] so that Xolair medication can be schedule for delivery in time for her 1/24/2023 Injection Appointment. Locaid message sent to patient with above information.

## 2023-01-18 NOTE — TELEPHONE ENCOUNTER
RN called [facility] to schedule delivery of Xolair     Spoke to rep named Hayde.     Expected delivery date: 1/19/2023

## 2023-01-21 RX ORDER — FAMOTIDINE 40 MG/1
TABLET, FILM COATED ORAL
Qty: 180 TABLET | Refills: 0 | Status: SHIPPED | OUTPATIENT
Start: 2023-01-21

## 2023-01-24 ENCOUNTER — TELEPHONE (OUTPATIENT)
Dept: ALLERGY | Facility: CLINIC | Age: 43
End: 2023-01-24

## 2023-01-24 ENCOUNTER — NURSE ONLY (OUTPATIENT)
Dept: ALLERGY | Facility: CLINIC | Age: 43
End: 2023-01-24

## 2023-01-24 VITALS
HEIGHT: 64 IN | HEART RATE: 79 BPM | OXYGEN SATURATION: 99 % | DIASTOLIC BLOOD PRESSURE: 86 MMHG | SYSTOLIC BLOOD PRESSURE: 128 MMHG | WEIGHT: 275 LBS | BODY MASS INDEX: 46.95 KG/M2

## 2023-01-24 DIAGNOSIS — L50.1 IDIOPATHIC URTICARIA: ICD-10-CM

## 2023-01-24 DIAGNOSIS — J30.1 SEASONAL ALLERGIC RHINITIS DUE TO POLLEN: Primary | ICD-10-CM

## 2023-01-24 PROCEDURE — 3079F DIAST BP 80-89 MM HG: CPT | Performed by: ALLERGY & IMMUNOLOGY

## 2023-01-24 PROCEDURE — 96372 THER/PROPH/DIAG INJ SC/IM: CPT | Performed by: ALLERGY & IMMUNOLOGY

## 2023-01-24 PROCEDURE — 3074F SYST BP LT 130 MM HG: CPT | Performed by: ALLERGY & IMMUNOLOGY

## 2023-01-24 PROCEDURE — 3008F BODY MASS INDEX DOCD: CPT | Performed by: ALLERGY & IMMUNOLOGY

## 2023-01-24 NOTE — TELEPHONE ENCOUNTER
Spoke with patient. Informed patient of Dr. Artur Mathis message. Patient states she would like an order for a blood test for dog. May we please have an order ? Thanks.

## 2023-01-24 NOTE — TELEPHONE ENCOUNTER
Patient presented to the office today,1/24/23 for Xolair injection for chronic hives. Patient states the weekend of 1/1523 she went to a shelter to possibly adopt a dog and did not have any reaction and by Wednesday( 1/18/23) she had some slight lip redness , denies swelling, no hives, no facial or mouth swelling, no problems breathing or swallowing. Patient states redness resolved within an hour without taking any medication. Patient is asking if this was a possible reaction to the dog. Informed patient reactions usually occur within an hour of exposure. Patient verbalizes understanding. Patient is asking if she could still continue to look to adopt a dog as she didn't know if she had a reaction. Informed patient on symptoms of a reaction, mentioned above. Patient states hives have improved and no hives since last Xolair but didn't know if she should go back to 300 mg of Xolair even though she is not having hives. Informed patient will forward this message to Dr. Tacos Carlos for further directions. Patient verbalizes understanding.

## 2023-01-24 NOTE — PROGRESS NOTES
Patient presents to the office today,1/24/23 for Xolair injection for chronic hives. See vital sign template for vital signs. Reviewed allergies, histories and medications. Patient was given Xolair 150 mg-left upper arm subcutaneously. Patient was observed in the office for 30 minutes and was reassessed and released from the office without complications. Next appointment 2/20/23    Xolair -LOT# [id number]            EXP.  10/23            [address] [id number]

## 2023-01-24 NOTE — TELEPHONE ENCOUNTER
Call reviewed and noted. Agree with triage advice. 2 days between allergen exposure and symptoms would be uncommon. If patient still wishes to pursue allergy testing to dog I can order a serum IgE to dog if needed.

## 2023-01-30 RX ORDER — OMALIZUMAB 150 MG/ML
300 INJECTION, SOLUTION SUBCUTANEOUS
Qty: 2 EACH | Refills: 2 | Status: SHIPPED | OUTPATIENT
Start: 2023-01-30

## 2023-02-04 ENCOUNTER — LAB ENCOUNTER (OUTPATIENT)
Dept: LAB | Facility: HOSPITAL | Age: 43
End: 2023-02-04
Attending: ALLERGY & IMMUNOLOGY
Payer: COMMERCIAL

## 2023-02-04 DIAGNOSIS — J30.1 SEASONAL ALLERGIC RHINITIS DUE TO POLLEN: ICD-10-CM

## 2023-02-04 PROCEDURE — 86003 ALLG SPEC IGE CRUDE XTRC EA: CPT

## 2023-02-04 PROCEDURE — 36415 COLL VENOUS BLD VENIPUNCTURE: CPT

## 2023-02-06 ENCOUNTER — TELEPHONE (OUTPATIENT)
Dept: ALLERGY | Facility: CLINIC | Age: 43
End: 2023-02-06

## 2023-02-06 LAB — DOG DANDER IGE QN: <0.1 KUA/L (ref ?–0.1)

## 2023-02-06 NOTE — TELEPHONE ENCOUNTER
RN left message for pt to go over lab work. Notified her in voicemail that dog testing was negative.   Provided call back number and office hours if she has any further questions.             ----- Message from Brisa Jean Baptiste MD sent at 2/6/2023  1:54 PM CST -----  Please contact patient with negative serum IgE testing to dog

## 2023-02-07 ENCOUNTER — PATIENT MESSAGE (OUTPATIENT)
Dept: ALLERGY | Facility: CLINIC | Age: 43
End: 2023-02-07

## 2023-02-07 RX ORDER — OMALIZUMAB 150 MG/ML
INJECTION, SOLUTION SUBCUTANEOUS
Qty: 2 EACH | Refills: 2 | Status: SHIPPED | OUTPATIENT
Start: 2023-02-07

## 2023-02-13 ENCOUNTER — TELEPHONE (OUTPATIENT)
Dept: ALLERGY | Facility: CLINIC | Age: 43
End: 2023-02-13

## 2023-02-13 NOTE — TELEPHONE ENCOUNTER
Spoke with Dora Ann at [facility] regarding delivery for Xolair 300 mg- delivery is scheduled for 2/15/23. Confirmed Dr. Suresh Go office address  with Dora Ann, representative for [facility]. No further questions at this time.

## 2023-02-20 ENCOUNTER — LAB ENCOUNTER (OUTPATIENT)
Dept: LAB | Age: 43
End: 2023-02-20
Attending: INTERNAL MEDICINE
Payer: COMMERCIAL

## 2023-02-20 ENCOUNTER — NURSE ONLY (OUTPATIENT)
Dept: ALLERGY | Facility: CLINIC | Age: 43
End: 2023-02-20

## 2023-02-20 ENCOUNTER — OFFICE VISIT (OUTPATIENT)
Dept: INTERNAL MEDICINE CLINIC | Facility: CLINIC | Age: 43
End: 2023-02-20

## 2023-02-20 VITALS
HEIGHT: 64 IN | DIASTOLIC BLOOD PRESSURE: 75 MMHG | RESPIRATION RATE: 18 BRPM | SYSTOLIC BLOOD PRESSURE: 128 MMHG | WEIGHT: 280 LBS | HEART RATE: 80 BPM | BODY MASS INDEX: 47.8 KG/M2

## 2023-02-20 VITALS — HEART RATE: 78 BPM | DIASTOLIC BLOOD PRESSURE: 84 MMHG | OXYGEN SATURATION: 98 % | SYSTOLIC BLOOD PRESSURE: 132 MMHG

## 2023-02-20 DIAGNOSIS — L50.1 CHRONIC IDIOPATHIC URTICARIA: ICD-10-CM

## 2023-02-20 DIAGNOSIS — Z00.00 PHYSICAL EXAM, ANNUAL: Primary | ICD-10-CM

## 2023-02-20 DIAGNOSIS — E55.9 VITAMIN D DEFICIENCY: ICD-10-CM

## 2023-02-20 DIAGNOSIS — Z12.31 ENCOUNTER FOR SCREENING MAMMOGRAM FOR MALIGNANT NEOPLASM OF BREAST: ICD-10-CM

## 2023-02-20 DIAGNOSIS — Z00.00 PHYSICAL EXAM, ANNUAL: ICD-10-CM

## 2023-02-20 DIAGNOSIS — Z12.11 COLON CANCER SCREENING: ICD-10-CM

## 2023-02-20 LAB
ALBUMIN SERPL-MCNC: 3.7 G/DL (ref 3.4–5)
ALBUMIN/GLOB SERPL: 0.9 {RATIO} (ref 1–2)
ALP LIVER SERPL-CCNC: 62 U/L
ALT SERPL-CCNC: 26 U/L
ANION GAP SERPL CALC-SCNC: 6 MMOL/L (ref 0–18)
AST SERPL-CCNC: 15 U/L (ref 15–37)
BILIRUB SERPL-MCNC: 0.8 MG/DL (ref 0.1–2)
BUN BLD-MCNC: 10 MG/DL (ref 7–18)
BUN/CREAT SERPL: 14.5 (ref 10–20)
CALCIUM BLD-MCNC: 9.4 MG/DL (ref 8.5–10.1)
CHLORIDE SERPL-SCNC: 107 MMOL/L (ref 98–112)
CHOLEST SERPL-MCNC: 139 MG/DL (ref ?–200)
CO2 SERPL-SCNC: 27 MMOL/L (ref 21–32)
CREAT BLD-MCNC: 0.69 MG/DL
DEPRECATED RDW RBC AUTO: 43.3 FL (ref 35.1–46.3)
ERYTHROCYTE [DISTWIDTH] IN BLOOD BY AUTOMATED COUNT: 13.9 % (ref 11–15)
FASTING PATIENT LIPID ANSWER: YES
FASTING STATUS PATIENT QL REPORTED: YES
GFR SERPLBLD BASED ON 1.73 SQ M-ARVRAT: 110 ML/MIN/1.73M2 (ref 60–?)
GLOBULIN PLAS-MCNC: 3.9 G/DL (ref 2.8–4.4)
GLUCOSE BLD-MCNC: 97 MG/DL (ref 70–99)
HCT VFR BLD AUTO: 42.2 %
HDLC SERPL-MCNC: 52 MG/DL (ref 40–59)
HGB BLD-MCNC: 13.6 G/DL
LDLC SERPL CALC-MCNC: 73 MG/DL (ref ?–100)
MCH RBC QN AUTO: 27.4 PG (ref 26–34)
MCHC RBC AUTO-ENTMCNC: 32.2 G/DL (ref 31–37)
MCV RBC AUTO: 84.9 FL
NONHDLC SERPL-MCNC: 87 MG/DL (ref ?–130)
OSMOLALITY SERPL CALC.SUM OF ELEC: 289 MOSM/KG (ref 275–295)
PLATELET # BLD AUTO: 262 10(3)UL (ref 150–450)
POTASSIUM SERPL-SCNC: 4.6 MMOL/L (ref 3.5–5.1)
PROT SERPL-MCNC: 7.6 G/DL (ref 6.4–8.2)
RBC # BLD AUTO: 4.97 X10(6)UL
SODIUM SERPL-SCNC: 140 MMOL/L (ref 136–145)
TRIGL SERPL-MCNC: 72 MG/DL (ref 30–149)
TSI SER-ACNC: 5.43 MIU/ML (ref 0.36–3.74)
VIT D+METAB SERPL-MCNC: 25.1 NG/ML (ref 30–100)
VLDLC SERPL CALC-MCNC: 11 MG/DL (ref 0–30)
WBC # BLD AUTO: 6.9 X10(3) UL (ref 4–11)

## 2023-02-20 PROCEDURE — 80061 LIPID PANEL: CPT

## 2023-02-20 PROCEDURE — 3008F BODY MASS INDEX DOCD: CPT | Performed by: INTERNAL MEDICINE

## 2023-02-20 PROCEDURE — 84443 ASSAY THYROID STIM HORMONE: CPT

## 2023-02-20 PROCEDURE — 85027 COMPLETE CBC AUTOMATED: CPT

## 2023-02-20 PROCEDURE — 96372 THER/PROPH/DIAG INJ SC/IM: CPT | Performed by: ALLERGY & IMMUNOLOGY

## 2023-02-20 PROCEDURE — 3074F SYST BP LT 130 MM HG: CPT | Performed by: INTERNAL MEDICINE

## 2023-02-20 PROCEDURE — 80053 COMPREHEN METABOLIC PANEL: CPT

## 2023-02-20 PROCEDURE — 3075F SYST BP GE 130 - 139MM HG: CPT | Performed by: ALLERGY & IMMUNOLOGY

## 2023-02-20 PROCEDURE — 99396 PREV VISIT EST AGE 40-64: CPT | Performed by: INTERNAL MEDICINE

## 2023-02-20 PROCEDURE — 36415 COLL VENOUS BLD VENIPUNCTURE: CPT

## 2023-02-20 PROCEDURE — 3079F DIAST BP 80-89 MM HG: CPT | Performed by: ALLERGY & IMMUNOLOGY

## 2023-02-20 PROCEDURE — 82306 VITAMIN D 25 HYDROXY: CPT

## 2023-02-20 PROCEDURE — 3078F DIAST BP <80 MM HG: CPT | Performed by: INTERNAL MEDICINE

## 2023-02-20 NOTE — PROGRESS NOTES
Xolair Progress Note:     See \"vitals\" flow sheet for vital sign detail. See MAR for injection detail. Per standing order pt to continue Xolair injections every 4  weeks 150 mg.   PT verified name, , and injection to be given. Xolair 150 mg SC given upper left arm at 0910. Xolair Exp: 2024  Xolair Lot #[id number]    Patient Status: Patient d/c'd home 30 minutes s/p Xolair injections for idopathic urticaria. Patient  tolerates injection without complications. Site WNL. No further questions or concerns at this time. Return to clinic for next injection as advised by Dr. Teodoro Mckeon. Time d/c to home: 1045 (pt was upstairs seeing her PCP).    Next appointment scheduled for: 3/20/2023

## 2023-02-24 ENCOUNTER — TELEPHONE (OUTPATIENT)
Dept: INTERNAL MEDICINE CLINIC | Facility: CLINIC | Age: 43
End: 2023-02-24

## 2023-02-24 NOTE — TELEPHONE ENCOUNTER
Patient called to schedule her colonoscopy but she is not able to get in until the end of August. Patient requesting to know if okay to wait until then, or should she be seen sooner. Please advise.

## 2023-02-25 NOTE — TELEPHONE ENCOUNTER
In 2021 tsh was normal , this most recent was most a little higher than than the normal -- can rechck in 4 weeeks along with the subsets     Take otc vit d 3 1000IU daily     Ok to wait until aug for cscope

## 2023-02-27 ENCOUNTER — TELEMEDICINE (OUTPATIENT)
Dept: INTERNAL MEDICINE CLINIC | Facility: CLINIC | Age: 43
End: 2023-02-27

## 2023-02-27 DIAGNOSIS — K59.00 CONSTIPATION, UNSPECIFIED CONSTIPATION TYPE: Primary | ICD-10-CM

## 2023-02-27 DIAGNOSIS — R20.0 NUMBNESS AND TINGLING: ICD-10-CM

## 2023-02-27 DIAGNOSIS — E07.9 THYROID DYSFUNCTION: ICD-10-CM

## 2023-02-27 DIAGNOSIS — E04.2 MULTIPLE THYROID NODULES: ICD-10-CM

## 2023-02-27 DIAGNOSIS — R20.2 NUMBNESS AND TINGLING: ICD-10-CM

## 2023-03-04 ENCOUNTER — HOSPITAL ENCOUNTER (OUTPATIENT)
Dept: GENERAL RADIOLOGY | Facility: HOSPITAL | Age: 43
Discharge: HOME | End: 2023-03-04
Attending: INTERNAL MEDICINE
Payer: COMMERCIAL

## 2023-03-04 ENCOUNTER — LAB ENCOUNTER (OUTPATIENT)
Dept: LAB | Facility: HOSPITAL | Age: 43
End: 2023-03-04
Attending: INTERNAL MEDICINE
Payer: COMMERCIAL

## 2023-03-04 DIAGNOSIS — K59.00 CONSTIPATION, UNSPECIFIED CONSTIPATION TYPE: ICD-10-CM

## 2023-03-04 DIAGNOSIS — E07.9 THYROID DYSFUNCTION: ICD-10-CM

## 2023-03-04 DIAGNOSIS — R20.2 NUMBNESS AND TINGLING: ICD-10-CM

## 2023-03-04 DIAGNOSIS — R20.0 NUMBNESS AND TINGLING: ICD-10-CM

## 2023-03-04 LAB
FOLATE SERPL-MCNC: 13.8 NG/ML (ref 8.7–?)
T3FREE SERPL-MCNC: 2.53 PG/ML (ref 2.4–4.2)
T4 FREE SERPL-MCNC: 1 NG/DL (ref 0.8–1.7)
TSI SER-ACNC: 8.87 MIU/ML (ref 0.36–3.74)
VIT B12 SERPL-MCNC: 304 PG/ML (ref 193–986)

## 2023-03-04 PROCEDURE — 82746 ASSAY OF FOLIC ACID SERUM: CPT

## 2023-03-04 PROCEDURE — 82607 VITAMIN B-12: CPT

## 2023-03-04 PROCEDURE — 84439 ASSAY OF FREE THYROXINE: CPT

## 2023-03-04 PROCEDURE — 36415 COLL VENOUS BLD VENIPUNCTURE: CPT

## 2023-03-04 PROCEDURE — 74018 RADEX ABDOMEN 1 VIEW: CPT | Performed by: INTERNAL MEDICINE

## 2023-03-04 PROCEDURE — 84443 ASSAY THYROID STIM HORMONE: CPT

## 2023-03-04 PROCEDURE — 84481 FREE ASSAY (FT-3): CPT

## 2023-03-08 ENCOUNTER — TELEPHONE (OUTPATIENT)
Dept: ALLERGY | Facility: CLINIC | Age: 43
End: 2023-03-08

## 2023-03-10 NOTE — TELEPHONE ENCOUNTER
Called [facility]  to coordinate delivery of Xolair. Spoke to [facility]    Next Injection Appointment Date: 3/20/2023    Expected Xolair Delivery Date: 3/14/2023    Confirmed dose and shipping address.

## 2023-03-11 ENCOUNTER — HOSPITAL ENCOUNTER (OUTPATIENT)
Dept: MAMMOGRAPHY | Facility: HOSPITAL | Age: 43
Discharge: HOME | End: 2023-03-11
Attending: INTERNAL MEDICINE
Payer: COMMERCIAL

## 2023-03-11 DIAGNOSIS — Z12.31 ENCOUNTER FOR SCREENING MAMMOGRAM FOR MALIGNANT NEOPLASM OF BREAST: ICD-10-CM

## 2023-03-11 DIAGNOSIS — R94.6 ABNORMAL THYROID FUNCTION TEST: Primary | ICD-10-CM

## 2023-03-11 PROCEDURE — 77063 BREAST TOMOSYNTHESIS BI: CPT | Performed by: INTERNAL MEDICINE

## 2023-03-11 PROCEDURE — 77067 SCR MAMMO BI INCL CAD: CPT | Performed by: INTERNAL MEDICINE

## 2023-03-19 ENCOUNTER — HOSPITAL ENCOUNTER (OUTPATIENT)
Dept: ULTRASOUND IMAGING | Age: 43
Discharge: HOME | End: 2023-03-19
Attending: INTERNAL MEDICINE
Payer: COMMERCIAL

## 2023-03-19 ENCOUNTER — HOSPITAL ENCOUNTER (OUTPATIENT)
Dept: ULTRASOUND IMAGING | Age: 43
End: 2023-03-19
Attending: INTERNAL MEDICINE
Payer: COMMERCIAL

## 2023-03-19 DIAGNOSIS — E07.9 THYROID DYSFUNCTION: ICD-10-CM

## 2023-03-19 DIAGNOSIS — E04.2 MULTIPLE THYROID NODULES: ICD-10-CM

## 2023-03-19 PROCEDURE — 76536 US EXAM OF HEAD AND NECK: CPT | Performed by: INTERNAL MEDICINE

## 2023-03-20 ENCOUNTER — NURSE ONLY (OUTPATIENT)
Dept: ALLERGY | Facility: CLINIC | Age: 43
End: 2023-03-20

## 2023-03-20 VITALS
BODY MASS INDEX: 47.8 KG/M2 | HEIGHT: 64 IN | WEIGHT: 280 LBS | OXYGEN SATURATION: 98 % | HEART RATE: 82 BPM | DIASTOLIC BLOOD PRESSURE: 77 MMHG | SYSTOLIC BLOOD PRESSURE: 145 MMHG

## 2023-03-20 DIAGNOSIS — L50.1 IDIOPATHIC URTICARIA: ICD-10-CM

## 2023-03-20 PROCEDURE — 3077F SYST BP >= 140 MM HG: CPT | Performed by: ALLERGY & IMMUNOLOGY

## 2023-03-20 PROCEDURE — 96372 THER/PROPH/DIAG INJ SC/IM: CPT | Performed by: ALLERGY & IMMUNOLOGY

## 2023-03-20 PROCEDURE — 3008F BODY MASS INDEX DOCD: CPT | Performed by: ALLERGY & IMMUNOLOGY

## 2023-03-20 PROCEDURE — 3078F DIAST BP <80 MM HG: CPT | Performed by: ALLERGY & IMMUNOLOGY

## 2023-03-20 NOTE — PROGRESS NOTES
Patient presented to the office for Xolair injection for chronic hives. see vital sign template for vital signs, reviewed allergies,medications and histories with patient. Xolair 150 mg was given left upper arm subcutaneously. Patient was observed in the office for 30 minutes, was reassessed and was released from the office without complications. Xolair 150 mg- LOT # [id number]                        EXP. 12/2023                        [address] [id number]    Next appointment is 4/17/23 for Xolair and appointment with Dr. James Yang.

## 2023-03-28 ENCOUNTER — MED REC SCAN ONLY (OUTPATIENT)
Dept: INTERNAL MEDICINE CLINIC | Facility: CLINIC | Age: 43
End: 2023-03-28

## 2023-04-06 ENCOUNTER — TELEPHONE (OUTPATIENT)
Dept: ALLERGY | Facility: CLINIC | Age: 43
End: 2023-04-06

## 2023-04-06 NOTE — TELEPHONE ENCOUNTER
[facility]. RN spoke with pharmacy , David Moseley. Xolair 150 mg/mL prefilled syringe x2 scheduled for delivery on 4/11/2023. Patient's next Xolair injection appt: 4/17/2023.

## 2023-04-07 ENCOUNTER — PATIENT MESSAGE (OUTPATIENT)
Dept: ALLERGY | Facility: CLINIC | Age: 43
End: 2023-04-07

## 2023-04-10 ENCOUNTER — TELEPHONE (OUTPATIENT)
Dept: ALLERGY | Facility: CLINIC | Age: 43
End: 2023-04-10

## 2023-04-10 NOTE — TELEPHONE ENCOUNTER
Xolair PA approval letter received from [facility]. Xolair 150 mg/mL prefilled syringe.     Effective Dates: 4/26/2023 - 4/26/2024    PA #: [id number]

## 2023-04-10 NOTE — TELEPHONE ENCOUNTER
PA/Referral for Xolair entered through epic and sent to [facility]. Await PA response.

## 2023-04-17 ENCOUNTER — NURSE ONLY (OUTPATIENT)
Dept: ALLERGY | Facility: CLINIC | Age: 43
End: 2023-04-17

## 2023-04-17 ENCOUNTER — OFFICE VISIT (OUTPATIENT)
Dept: ALLERGY | Facility: CLINIC | Age: 43
End: 2023-04-17

## 2023-04-17 VITALS
SYSTOLIC BLOOD PRESSURE: 139 MMHG | OXYGEN SATURATION: 98 % | DIASTOLIC BLOOD PRESSURE: 85 MMHG | HEART RATE: 76 BPM | TEMPERATURE: 98 F

## 2023-04-17 DIAGNOSIS — J30.1 SEASONAL ALLERGIC RHINITIS DUE TO POLLEN: ICD-10-CM

## 2023-04-17 DIAGNOSIS — Z79.899 VISIT FOR MONITORING XOLAIR THERAPY: ICD-10-CM

## 2023-04-17 DIAGNOSIS — Z92.29 COVID-19 VACCINE SERIES COMPLETED: ICD-10-CM

## 2023-04-17 DIAGNOSIS — Z51.81 VISIT FOR MONITORING XOLAIR THERAPY: ICD-10-CM

## 2023-04-17 DIAGNOSIS — L50.1 CHRONIC IDIOPATHIC URTICARIA: Primary | ICD-10-CM

## 2023-04-17 DIAGNOSIS — Z23 FLU VACCINE NEED: ICD-10-CM

## 2023-04-17 DIAGNOSIS — L50.1 IDIOPATHIC URTICARIA: ICD-10-CM

## 2023-04-17 PROCEDURE — 3075F SYST BP GE 130 - 139MM HG: CPT | Performed by: ALLERGY & IMMUNOLOGY

## 2023-04-17 PROCEDURE — 3079F DIAST BP 80-89 MM HG: CPT | Performed by: ALLERGY & IMMUNOLOGY

## 2023-04-17 PROCEDURE — 99214 OFFICE O/P EST MOD 30 MIN: CPT | Performed by: ALLERGY & IMMUNOLOGY

## 2023-04-17 NOTE — PROGRESS NOTES
Per standing order patient to continue Xolair injections every 28 days- 150 mg. Patient verified name, , and injection to be given. Xolair 150mL/mg prefilled syringe  NDC: [id number]  LOT: [id number]  EXP: 2024        At 1627, Xolair 150mg/mL prefilled syringe administered subcutaneously to right upper arm . Patient tolerated injection with no adverse side effects noted at time of administration. Patient monitored x 30 min after administration. At , patient released from office without any sign/symptoms of local or systemic reaction. Pre PF: (3 readings) for asthma diagnosis  Post PF: (3 readings)  ? Patient scheduled next Biologic Injection Appointment for 05/15/23 1610.

## 2023-04-17 NOTE — PATIENT INSTRUCTIONS
#1 chronic spontaneous urticaria  Approximately 2 days prior to her next Xolair dose. Current Xolair dosing is 150 every 4 weeks. No ED visits or prednisone in the interim. Mild hives  Currently on Zyrtec or Xyzal at least once a day  No ED visits or prednisone in the interim  Given her good control over the past year we will attempt to space out her Xolair 150 mg every 5 weeks instead of every 4 weeks. Reviewed goal would be to attempt to wean her out her Xolair 150 mg every 8 weeks and if still remains well controlled then may consider discontinuation of Xolair  May increase Zyrtec or Xyzal from once a day up to 4 times per day if needed    #2 allergic rhinitis  Continue with Zyrtec or Xyzal  May add Flonase or Nasacort 2 sprays per nostril once a day if having prominent nasal congestion postnasal drip    #3 COVID vaccines up-to-date.   Recommend booster and indicated    #4 flu vaccine in the fall      Patient received Xolair in office today followed by 30 minutes of observation without issue or incident

## 2023-04-18 RX ORDER — OMALIZUMAB 150 MG/ML
150 INJECTION, SOLUTION SUBCUTANEOUS
Qty: 1 ML | Refills: 0 | Status: SHIPPED | OUTPATIENT
Start: 2023-04-18

## 2023-04-18 NOTE — TELEPHONE ENCOUNTER
[facility] fax received asking for refill for Xolair. Patient last seen in Allergy for physician visit on 4/17/2023 for . . .    Chronic idiopathic urticaria  (primary encounter diagnosis)  Visit for monitoring xolair therapy  Seasonal allergic rhinitis due to pollen  Covid-19 vaccine series completed  Flu vaccine need    Patient's dose has changed to 150 mg of Xolair monthly. New Prescription Pended.

## 2023-04-28 ENCOUNTER — TELEPHONE (OUTPATIENT)
Dept: CASE MANAGEMENT | Age: 43
End: 2023-04-28

## 2023-04-28 DIAGNOSIS — T78.3XXA ANGIOEDEMA, INITIAL ENCOUNTER: ICD-10-CM

## 2023-04-28 DIAGNOSIS — L50.9 URTICARIA: ICD-10-CM

## 2023-04-28 DIAGNOSIS — T78.40XD ALLERGY, SUBSEQUENT ENCOUNTER: Primary | ICD-10-CM

## 2023-04-28 NOTE — TELEPHONE ENCOUNTER
Dr. True Barrios,     Patient is requesting a referral for Dr. Marcela Hdez, for allergies and shots, and a referral for angioedema. Pended referral please review diagnosis and sign off if you agree. Thank you.   Prosper Miramontes

## 2023-05-01 RX ORDER — OMALIZUMAB 150 MG/ML
150 INJECTION, SOLUTION SUBCUTANEOUS
Qty: 1 EACH | Refills: 2 | Status: SHIPPED | OUTPATIENT
Start: 2023-05-01

## 2023-05-04 ENCOUNTER — TELEPHONE (OUTPATIENT)
Dept: ALLERGY | Facility: CLINIC | Age: 43
End: 2023-05-04

## 2023-05-15 ENCOUNTER — NURSE ONLY (OUTPATIENT)
Dept: ALLERGY | Facility: CLINIC | Age: 43
End: 2023-05-15

## 2023-05-15 VITALS
SYSTOLIC BLOOD PRESSURE: 128 MMHG | HEART RATE: 76 BPM | RESPIRATION RATE: 18 BRPM | OXYGEN SATURATION: 96 % | DIASTOLIC BLOOD PRESSURE: 85 MMHG

## 2023-05-15 DIAGNOSIS — L50.1 CHRONIC IDIOPATHIC URTICARIA: ICD-10-CM

## 2023-05-15 NOTE — PROGRESS NOTES
Xolair Progress Note:     Pre-treatment Peak Flow: NA  Post treatment Peak Flow: NA  See \"vitals\" flow sheet for vital sign detail. See MAR for injection detail. Per standing order pt to continue Xolair injections every 4  weeks 150mg. PT verified name, , and injection to be given. Xolair 150 mg SC given upper right and left arms at 1611. LOT#: [id number]  EXP: 2024      Patient Status: Patient d/c'd home 30 minutes s/p Xolair injections for idopathic urticaria. Patient  tolerates injection without complications. Site WNL. No further questions or concerns at this time. Return to clinic for next injection as advised by Dr. Jessie Koehler.      Time d/c to home: [phone]  Next appointment scheduled for: 2023

## 2023-05-23 RX ORDER — OMALIZUMAB 150 MG/ML
INJECTION, SOLUTION SUBCUTANEOUS
Qty: 1 EACH | Refills: 2 | Status: SHIPPED | OUTPATIENT
Start: 2023-05-23

## 2023-05-24 ENCOUNTER — TELEPHONE (OUTPATIENT)
Dept: ALLERGY | Facility: CLINIC | Age: 43
End: 2023-05-24

## 2023-05-25 NOTE — TELEPHONE ENCOUNTER
[facility] contacted at [phone]. Informed pharmacy service rep that Xolair is not needed at this time and may be placed on hold until patient uses up current doses in Allergy Office.

## 2023-06-12 ENCOUNTER — NURSE ONLY (OUTPATIENT)
Dept: ALLERGY | Facility: CLINIC | Age: 43
End: 2023-06-12

## 2023-06-12 DIAGNOSIS — L50.1 CHRONIC IDIOPATHIC URTICARIA: ICD-10-CM

## 2023-06-12 NOTE — PROGRESS NOTES
Xolair Progress Note:     Pre-treatment Peak Flow: NA  Post treatment Peak Flow: NA  See \"vitals\" flow sheet for vital sign detail. See MAR for injection detail. Per standing order pt to continue Xolair injections every 4  weeks 150 mg.   PT verified name, , and injection to be given. Xolair 150 mg SC given upper right and left arms at 1605. LOT [id number]  EXP: 2023      Patient Status: Patient d/c'd home 30 minutes s/p Xolair injections for idopathic urticaria. Patient  tolerates injection without complications. Site WNL. No further questions or concerns at this time. Return to clinic for next injection as advised by Dr. Tacos Carlos. Time d/c to home: 1635  Next appointment scheduled for: 2023--pt is going 5 weeks per Dr. Elvira Arellano recommendations to attempt tapering off of Xolair.

## 2023-06-27 ENCOUNTER — OFFICE VISIT (OUTPATIENT)
Dept: ENDOCRINOLOGY CLINIC | Facility: CLINIC | Age: 43
End: 2023-06-27

## 2023-06-27 ENCOUNTER — TELEPHONE (OUTPATIENT)
Dept: ALLERGY | Facility: CLINIC | Age: 43
End: 2023-06-27

## 2023-06-27 VITALS
BODY MASS INDEX: 48.49 KG/M2 | WEIGHT: 284 LBS | DIASTOLIC BLOOD PRESSURE: 87 MMHG | HEIGHT: 64 IN | HEART RATE: 80 BPM | SYSTOLIC BLOOD PRESSURE: 126 MMHG

## 2023-06-27 DIAGNOSIS — E03.8 SUBCLINICAL HYPOTHYROIDISM: Primary | ICD-10-CM

## 2023-06-27 DIAGNOSIS — E04.1 THYROID NODULE: ICD-10-CM

## 2023-06-27 PROCEDURE — 3008F BODY MASS INDEX DOCD: CPT | Performed by: INTERNAL MEDICINE

## 2023-06-27 PROCEDURE — 3074F SYST BP LT 130 MM HG: CPT | Performed by: INTERNAL MEDICINE

## 2023-06-27 PROCEDURE — 3079F DIAST BP 80-89 MM HG: CPT | Performed by: INTERNAL MEDICINE

## 2023-06-27 PROCEDURE — 99203 OFFICE O/P NEW LOW 30 MIN: CPT | Performed by: INTERNAL MEDICINE

## 2023-06-28 ENCOUNTER — LAB ENCOUNTER (OUTPATIENT)
Dept: LAB | Facility: HOSPITAL | Age: 43
End: 2023-06-28
Attending: INTERNAL MEDICINE
Payer: COMMERCIAL

## 2023-06-28 DIAGNOSIS — E03.8 SUBCLINICAL HYPOTHYROIDISM: ICD-10-CM

## 2023-06-28 LAB
CHOLEST SERPL-MCNC: 142 MG/DL (ref ?–200)
FASTING PATIENT LIPID ANSWER: YES
HDLC SERPL-MCNC: 49 MG/DL (ref 40–59)
LDLC SERPL CALC-MCNC: 69 MG/DL (ref ?–100)
NONHDLC SERPL-MCNC: 93 MG/DL (ref ?–130)
T3FREE SERPL-MCNC: 2.54 PG/ML (ref 2.4–4.2)
T4 FREE SERPL-MCNC: 0.9 NG/DL (ref 0.8–1.7)
THYROGLOB SERPL-MCNC: <15 U/ML (ref ?–60)
THYROPEROXIDASE AB SERPL-ACNC: 42 U/ML (ref ?–60)
TRIGL SERPL-MCNC: 139 MG/DL (ref 30–149)
TSI SER-ACNC: 6.46 MIU/ML (ref 0.36–3.74)
VLDLC SERPL CALC-MCNC: 21 MG/DL (ref 0–30)

## 2023-06-28 PROCEDURE — 84481 FREE ASSAY (FT-3): CPT

## 2023-06-28 PROCEDURE — 36415 COLL VENOUS BLD VENIPUNCTURE: CPT

## 2023-06-28 PROCEDURE — 84439 ASSAY OF FREE THYROXINE: CPT

## 2023-06-28 PROCEDURE — 80061 LIPID PANEL: CPT

## 2023-06-28 PROCEDURE — 86376 MICROSOMAL ANTIBODY EACH: CPT

## 2023-06-28 PROCEDURE — 84443 ASSAY THYROID STIM HORMONE: CPT

## 2023-06-28 PROCEDURE — 86800 THYROGLOBULIN ANTIBODY: CPT

## 2023-06-29 ENCOUNTER — TELEPHONE (OUTPATIENT)
Dept: ALLERGY | Facility: CLINIC | Age: 43
End: 2023-06-29

## 2023-07-17 ENCOUNTER — NURSE ONLY (OUTPATIENT)
Dept: ALLERGY | Facility: CLINIC | Age: 43
End: 2023-07-17

## 2023-07-17 VITALS
DIASTOLIC BLOOD PRESSURE: 88 MMHG | RESPIRATION RATE: 20 BRPM | OXYGEN SATURATION: 97 % | TEMPERATURE: 98 F | HEART RATE: 78 BPM | SYSTOLIC BLOOD PRESSURE: 138 MMHG

## 2023-07-17 DIAGNOSIS — L50.1 CHRONIC IDIOPATHIC URTICARIA: Primary | ICD-10-CM

## 2023-07-17 NOTE — H&P
Per standing order patient to continue Xolair injections every 35 days- 150 mg. Patient verified name, , and injection to be given. Xolair 150 mg/mL prefilled syringe  NDC #: [id number]  Lot #: [id number]  Exp Date: 2024    At 1310, Xolair 150 mg/mL prefilled syringe administered subcutaneously to Left Upper Arm. Patient tolerated injection with no adverse side effects noted at time of administration. ? Patient monitored x 30 min after administration. At [phone] , patient released from office without any sign/symptoms of local or systemic reaction. Pre PF: (3 readings) for asthma diagnosis  Post PF: (3 readings)  ? Patient scheduled next Biologic Injection Appointment for 2023 at [address]

## 2023-07-19 ENCOUNTER — TELEPHONE (OUTPATIENT)
Dept: ALLERGY | Facility: CLINIC | Age: 43
End: 2023-07-19

## 2023-08-21 ENCOUNTER — NURSE ONLY (OUTPATIENT)
Dept: ALLERGY | Facility: CLINIC | Age: 43
End: 2023-08-21

## 2023-08-21 VITALS
HEART RATE: 84 BPM | SYSTOLIC BLOOD PRESSURE: 157 MMHG | DIASTOLIC BLOOD PRESSURE: 97 MMHG | BODY MASS INDEX: 48.49 KG/M2 | WEIGHT: 284 LBS | HEIGHT: 64 IN | OXYGEN SATURATION: 98 %

## 2023-08-21 DIAGNOSIS — L50.1 IDIOPATHIC URTICARIA: Primary | ICD-10-CM

## 2023-08-21 PROCEDURE — 3008F BODY MASS INDEX DOCD: CPT | Performed by: ALLERGY & IMMUNOLOGY

## 2023-08-21 PROCEDURE — 3077F SYST BP >= 140 MM HG: CPT | Performed by: ALLERGY & IMMUNOLOGY

## 2023-08-21 PROCEDURE — 3080F DIAST BP >= 90 MM HG: CPT | Performed by: ALLERGY & IMMUNOLOGY

## 2023-08-21 PROCEDURE — 96372 THER/PROPH/DIAG INJ SC/IM: CPT | Performed by: ALLERGY & IMMUNOLOGY

## 2023-08-21 RX ORDER — AMOXICILLIN 500 MG/1
500 CAPSULE ORAL 4 TIMES DAILY
COMMUNITY
Start: 2023-07-04

## 2023-08-21 NOTE — PROGRESS NOTES
Patient presented to the office for Xolair for chronic hives. See vital sign template for vital signs. Reviewed allergies, histories and medications with patient. Xolair 150 mg given left upper arm subcutaneously. Patient was observed in the office for 30 minutes and was reassessed and release from the office without complications. Xolair- [facility]#[id number]              EXP. 12/2023             [address] [id number]    NEXT APPT.  10/2/23 AT 4:50 PM.

## 2023-08-23 ENCOUNTER — TELEPHONE (OUTPATIENT)
Dept: ALLERGY | Facility: CLINIC | Age: 43
End: 2023-08-23

## 2023-09-18 ENCOUNTER — TELEPHONE (OUTPATIENT)
Dept: ALLERGY | Facility: CLINIC | Age: 43
End: 2023-09-18

## 2023-09-18 RX ORDER — OMALIZUMAB 150 MG/ML
INJECTION, SOLUTION SUBCUTANEOUS
Qty: 1 EACH | Refills: 2 | Status: SHIPPED | OUTPATIENT
Start: 2023-09-18

## 2023-09-18 NOTE — TELEPHONE ENCOUNTER
Mary Grace Ortiz with Pharmacy is calling to schedule a delivery to the office for medication       Omalizumab Clayton Todd) 150 MG/ML Subcutaneous Solution Prefilled Syringe     Ph #[phone]

## 2023-09-19 NOTE — TELEPHONE ENCOUNTER
[facility] contacted. RN spoke with pharmacy service Mary Anne springer. Informed rep that patient has ample supply of Xolair in office currently, nurse will call to schedule Xolair when needed. Rep made note in patient's file.

## 2023-10-02 ENCOUNTER — TELEPHONE (OUTPATIENT)
Dept: ALLERGY | Facility: CLINIC | Age: 43
End: 2023-10-02

## 2023-10-02 ENCOUNTER — NURSE ONLY (OUTPATIENT)
Dept: ALLERGY | Facility: CLINIC | Age: 43
End: 2023-10-02

## 2023-10-02 VITALS
HEART RATE: 85 BPM | RESPIRATION RATE: 18 BRPM | DIASTOLIC BLOOD PRESSURE: 76 MMHG | SYSTOLIC BLOOD PRESSURE: 120 MMHG | OXYGEN SATURATION: 97 %

## 2023-10-02 DIAGNOSIS — L50.1 CHRONIC IDIOPATHIC URTICARIA: Primary | ICD-10-CM

## 2023-10-02 NOTE — TELEPHONE ENCOUNTER
Patient in office today for Xolair injection  Patient switched insurances - new enrollment form completed  RN completed office portion  Placed in [facility] for Dr. Renuka Doe signature and prescriber information

## 2023-10-02 NOTE — PROGRESS NOTES
Xolair Progress Note:     Pre-treatment Peak Flow: NA  Post treatment Peak Flow: NA  See \"vitals\" flow sheet for vital sign detail. See MAR for injection detail. Per standing order pt to continue Xolair injections every 7  weeks 150 mg (Pt is tapering out). PT verified name, , and injection to be given. Xolair 150 mg SC given upper right arm at 1638 . LOT#: [id number]  EXP: 2024       Patient Status: Patient d/c'd home 30 minutes s/p Xolair injections for idopathic urticaria. Patient  tolerates injection without complications. Site WNL. No further questions or concerns at this time. Return to clinic for next injection as advised by Dr. Ruth Villagran. Time d/c to home: 1708  Next appointment scheduled for: 7 weeks from now as pt is tapering. Scheduled on 2023. 06-Jul-2021 19:22

## 2023-10-03 ENCOUNTER — PATIENT MESSAGE (OUTPATIENT)
Dept: ALLERGY | Facility: CLINIC | Age: 43
End: 2023-10-03

## 2023-10-03 NOTE — TELEPHONE ENCOUNTER
Completed Forms as below signed by Dr. Edu Rausch along with copy of insurance card faxed to [address] [place] [address] at [phone]. Fax confirmation received. Await response from [employer]. Patient's insurance still [facility] HMO through [facility]. ID number the same, Group number changed. Xolair PA initiated through [facility], Entered in [facility]

## 2023-10-05 NOTE — TELEPHONE ENCOUNTER
From: Shane Mclean  To: Neeraj Robles  Sent: 10/3/2023  4:32 PM CDT  Subject: Change of Insurance/Xolair communication     Hi, I was in yesterday and shared with the nurses that my insurance changed, and they had me fill out a new xolair application. I also received this letter in the mail yesterday with information that says I should share. It is attached. Please let me know if there is something else I should do on my end.  Thank you so much! -Sejal

## 2023-10-06 NOTE — TELEPHONE ENCOUNTER
Fax received from Elie Solomon with Benefit Information. Pharmacy Benefit:  Call [employer] for PA for Xolair : [phone]     Medical Benefit:  [facility]        [employer] contacted at telephone number above. RN spoke with PA representative, Armaan Ghosh. Armaan Ghosh reported that patient's original PA for Xolair 150 mg/mL prefilled syringe is still valid and in place as below. Patient's policy did not change, she received a new insurance card for the same policy. Dates: 04/25/2022 to 04/25/2023. Case Certification Number: [id number]     Covered specialty pharmacy remains [facility]. See [place] message sent to patient. Michelle Post,     I spoke with [employer] regarding Xolair coverage. I was informed by the [employer] service rep that your current Prior Authorization through your prescription benefit is still in place as below. Dates: 04/25/2022 to 04/25/2023. Case Certification Number: Gracy Jeans. Your covered specialty pharmacy remains [facility]. There is no further action required at this point.         Leyda Johns, RN

## 2023-10-10 NOTE — TELEPHONE ENCOUNTER
[employer]'s form reporting that patient's PA for Xolair is approved as below, with Case # and approval dates as below faxed to [address] [place] [address] at [phone]. Await Fax confirmation. Xolair 150 mg/mL prefilled syringe PA approval information:       Dates: 04/25/2022 to 04/25/2023.   Case Certification Number: [id number]

## 2023-10-16 ENCOUNTER — PATIENT MESSAGE (OUTPATIENT)
Dept: ALLERGY | Facility: CLINIC | Age: 43
End: 2023-10-16

## 2023-10-17 NOTE — TELEPHONE ENCOUNTER
From: Caridad Morris  To: Allison Araujo  Sent: 10/16/2023 5:16 PM CDT  Subject: Letter from [address] [place] [address], I received a letter from [facility] saying my case has been closed & the doctor needs to send more information. I attached a photo of it. I called [facility] and they said Dr Adi Malhotra needs to submit info, and suggested I reach out.

## 2023-10-19 ENCOUNTER — PATIENT OUTREACH (OUTPATIENT)
Dept: CASE MANAGEMENT | Age: 43
End: 2023-10-19

## 2023-10-19 NOTE — PROCEDURES
The office order for PCP removal request is Approved and finalized on October 19, 2023.     Thanks,  [facility] [employer]

## 2023-11-04 ENCOUNTER — LAB ENCOUNTER (OUTPATIENT)
Dept: LAB | Facility: HOSPITAL | Age: 43
End: 2023-11-04
Attending: INTERNAL MEDICINE
Payer: COMMERCIAL

## 2023-11-04 DIAGNOSIS — E03.8 SUBCLINICAL HYPOTHYROIDISM: ICD-10-CM

## 2023-11-04 LAB
T4 FREE SERPL-MCNC: 1.1 NG/DL (ref 0.8–1.7)
TSI SER-ACNC: 4.84 MIU/ML (ref 0.55–4.78)

## 2023-11-04 PROCEDURE — 84443 ASSAY THYROID STIM HORMONE: CPT

## 2023-11-04 PROCEDURE — 84439 ASSAY OF FREE THYROXINE: CPT

## 2023-11-04 PROCEDURE — 36415 COLL VENOUS BLD VENIPUNCTURE: CPT

## 2023-11-20 ENCOUNTER — NURSE ONLY (OUTPATIENT)
Dept: ALLERGY | Facility: CLINIC | Age: 43
End: 2023-11-20
Payer: COMMERCIAL

## 2023-11-20 VITALS — SYSTOLIC BLOOD PRESSURE: 137 MMHG | DIASTOLIC BLOOD PRESSURE: 88 MMHG

## 2023-11-20 DIAGNOSIS — L50.1 CHRONIC IDIOPATHIC URTICARIA: Primary | ICD-10-CM

## 2023-11-20 NOTE — PROGRESS NOTES
Per standing order patient to continue Xolair injections every 8 weeks and to taper out - 150 mg. Patient verified name, , and injection to be given. At 1610, Xolair 150 mg administered subcutaneously to left upper arm . Xolair 150 mg/mL prefilled syringe x 1  Lot #: [id number]  Exp Date: 2024      Patient tolerated injection with no adverse side effects noted at time of administration. ? Patient monitored x 30 min after administration. At [phone], patient released from office without any sign/symptoms of local or systemic reaction. ?   Patient scheduled next Biologic Injection Appointment for 24

## 2024-01-15 ENCOUNTER — TELEPHONE (OUTPATIENT)
Dept: ALLERGY | Facility: CLINIC | Age: 44
End: 2024-01-15

## 2024-01-15 ENCOUNTER — NURSE ONLY (OUTPATIENT)
Dept: ALLERGY | Facility: CLINIC | Age: 44
End: 2024-01-15

## 2024-01-15 ENCOUNTER — OFFICE VISIT (OUTPATIENT)
Dept: ALLERGY | Facility: CLINIC | Age: 44
End: 2024-01-15

## 2024-01-15 VITALS
RESPIRATION RATE: 16 BRPM | HEART RATE: 92 BPM | SYSTOLIC BLOOD PRESSURE: 166 MMHG | DIASTOLIC BLOOD PRESSURE: 99 MMHG | OXYGEN SATURATION: 99 %

## 2024-01-15 DIAGNOSIS — Z92.29 COVID-19 VACCINE SERIES COMPLETED: ICD-10-CM

## 2024-01-15 DIAGNOSIS — L50.1 CHRONIC IDIOPATHIC URTICARIA: Primary | ICD-10-CM

## 2024-01-15 DIAGNOSIS — Z79.899 VISIT FOR MONITORING XOLAIR THERAPY: ICD-10-CM

## 2024-01-15 DIAGNOSIS — Z51.81 VISIT FOR MONITORING XOLAIR THERAPY: ICD-10-CM

## 2024-01-15 DIAGNOSIS — Z23 FLU VACCINE NEED: ICD-10-CM

## 2024-01-15 DIAGNOSIS — L50.1 IDIOPATHIC URTICARIA: Primary | ICD-10-CM

## 2024-01-15 DIAGNOSIS — J30.1 SEASONAL ALLERGIC RHINITIS DUE TO POLLEN: ICD-10-CM

## 2024-01-15 PROCEDURE — 99214 OFFICE O/P EST MOD 30 MIN: CPT | Performed by: ALLERGY & IMMUNOLOGY

## 2024-01-15 PROCEDURE — 3077F SYST BP >= 140 MM HG: CPT | Performed by: ALLERGY & IMMUNOLOGY

## 2024-01-15 PROCEDURE — 3080F DIAST BP >= 90 MM HG: CPT | Performed by: ALLERGY & IMMUNOLOGY

## 2024-01-15 RX ORDER — EPINEPHRINE 0.3 MG/.3ML
INJECTION SUBCUTANEOUS
Qty: 1 EACH | Refills: 0 | Status: SHIPPED | OUTPATIENT
Start: 2024-01-15

## 2024-01-15 NOTE — PROGRESS NOTES
Patient was seen for an appointment with Dr. Joshi today, 1/15/24 for chronic hives and Xolair injection.    See vital sign template and Dr. Joshi's progress notes.    Xolair 150 mg given left upper arm subcutaneously.    Patient was observed in the office for 30 minutes and was reassessed and was released from the office without complications.      Patient to notify our office should hives do not improve and also to give an update on whether she wishes to continue or discontinue Xolair in approximately 8 weeks.    Xolair- LOT#[id number]             EXP.02/2024             NDC 25354-357-08

## 2024-01-15 NOTE — PROGRESS NOTES
Sejal Gonzales is a 43 year old female.    HPI:   No chief complaint on file.    Patient is a 43-year-old female who presents for follow-up    Patient last seen by me in April 2023  Patient has history of chronic idiopathic urticaria and allergic rhinitis      Medications include Xolair Atarax EpiPen  COVID-vaccine x 3 doses on record  No flu vaccine on record for this fall    Today patient reports    Ciu   Active or persistent symptoms:  hives this past week   Last xolair was 8 weeks ago   Facial swelling 2x since last xolair  11/27/23 + eye swelling, took xyzal , better within 24 hrs  Dec 13 3023:  2am , puffy eye, xyzal , lasted 2 days with xyzal   ED visits or prednisone in the interim denies  Active meds: Xolair 150  mg every 4 weeks, Atarax, xyzal   Last xolair was 8 weeks ago (1 st time at 8 weeks)   No prior allegra   No ed epi or oral swelling       Ar:  Active or persistent symptoms denies current symptoms  Active meds Claritin or Zyrtec as needed  pets : denies   2 dogs     No hives today     HISTORY:  Past Medical History:   Diagnosis Date    Angioedema     IBS (irritable bowel syndrome) 2010    diet    PONV (postoperative nausea and vomiting)     Visual impairment     contacts, eyeglasses      Past Surgical History:   Procedure Laterality Date    CHOLECYSTECTOMY  2005    COLONOSCOPY  2/25/2010    COLONOSCOPY      THYROID LOBECTOMY,UNILAT  01/17/2022    UPPER GI ENDOSCOPY,EXAM      WISDOM TEETH REMOVED        Family History   Problem Relation Age of Onset    No Known Problems Mother     Heart Disorder Father         pacemaker    Breast Cancer Maternal Grandmother 70    Cancer Maternal Grandmother 70        bone    Diabetes Maternal Grandmother     Cancer Maternal Grandfather         lung -smoker (cause of death)    Heart Disease Maternal Grandfather     Heart Disease Paternal Grandfather     Diabetes Paternal Grandfather       Social History:   Social History     Socioeconomic History    Marital status:     Tobacco Use    Smoking status: Never     Passive exposure: Never    Smokeless tobacco: Never    Tobacco comments:     No Household Smokers.    Vaping Use    Vaping Use: Never used   Substance and Sexual Activity    Alcohol use: No     Alcohol/week: 0.0 standard drinks of alcohol    Drug use: No    Sexual activity: Yes     Partners: Male   Other Topics Concern    Caffeine Concern Yes     Comment: coffee, 3cups weekly        Medications (Active prior to today's visit):  Current Outpatient Medications   Medication Sig Dispense Refill    Omalizumab (XOLAIR) 150 MG/ML Subcutaneous Solution Prefilled Syringe INJECT 1 SYRINGE (150 MG) UNDER THE SKIN (SUBCUTANEOUS INJECTION) EVERY 4 WEEKS (Patient not taking: Reported on 11/20/2023) 1 each 2    amoxicillin 500 MG Oral Cap Take 1 capsule (500 mg total) by mouth 4 (four) times daily. (Patient not taking: Reported on 8/21/2023)      Omalizumab (XOLAIR) 150 MG/ML Subcutaneous Solution Prefilled Syringe Inject 150 mg into the skin every 28 days. (Patient not taking: Reported on 8/21/2023) 1 each 2    Ergocalciferol (VITAMIN D OR) Take by mouth.      Omalizumab (XOLAIR) 150 MG/ML Subcutaneous Solution Prefilled Syringe Inject 300 mg into the skin every 28 days. 2 each 2    Multiple Vitamins-Minerals (MULTI-VITAMIN/MINERALS) Oral Tab Take 1 tablet by mouth daily.      EPINEPHrine (EPIPEN 2-BRITTANY) 0.3 MG/0.3ML Injection Solution Auto-injector Inject IM in event of  allergic reaction (Patient not taking: Reported on 12/27/2022) 2 each 0    hydrOXYzine 25 MG Oral Tab Take 1 tablet (25 mg total) by mouth every 4 (four) hours as needed for Itching. (Patient not taking: Reported on 4/18/2022) 30 tablet 0    famotidine (PEPCID) 40 MG Oral Tab Take 1 tablet (40 mg total) by mouth daily. (Patient not taking: Reported on 4/17/2023) 180 tablet 1    predniSONE 10 MG Oral Tab Take 3 tabs(30 mg) with food once a day x 5 days (Patient not taking: Reported on 4/18/2022) 15 tablet 0     levocetirizine (XYZAL) 5 MG Oral Tab Take 1 tablet (5 mg total) by mouth nightly. 30 tablet 5    diphenhydrAMINE HCl (BENADRYL ALLERGY OR) Take by mouth. (Patient not taking: Reported on 6/12/2023)         Allergies:  Allergies   Allergen Reactions    Mucinex Dm Maximum Strength [Dextromethorphan-Guaifenesin] HIVES and SWELLING    Adhesive Tape RASH     bandaids cause rash, paper tape ok         ROS:   Allergic/Immuno:  See hpi  Cardiovascular:  Negative for irregular heartbeat/palpitations, chest pain, edema  Constitutional:  Negative night sweats,weight loss, irritability and lethargy  ENMT:  Negative for ear drainage, hearing loss and nasal drainage  Eyes:  Negative for eye discharge and vision loss  Gastrointestinal:  Negative for abdominal pain, diarrhea and vomiting  Integumentary:   see hpi   Respiratory:  Negative for cough, dyspnea and wheezing    PHYSICAL EXAM:   Constitutional: responsive, no acute distress noted  Head/Face: NC/Atraumatic  Eyes/Vision: conjunctiva and lids are normal extraocular motion is intact   Ears/Audiometry: tympanic membranes are normal bilaterally hearing is grossly intact  Nose/Mouth/Throat: nose and throat are clear mucous membranes are moist   Neck/Thyroid: neck is supple without adenopathy  Lymphatic: no abnormal cervical, supraclavicular or axillary adenopathy is noted  Respiratory: normal to inspection lungs are clear to auscultation bilaterally normal respiratory effort   Cardiovascular: regular rate and rhythm no murmurs, gallups, or rubs  Abdomen: soft non-tender non-distended  Skin/Hair: no unusual rashes present   Extremities: no edema, cyanosis, or clubbing     ASSESSMENT/PLAN:   Assessment   Encounter Diagnoses   Name Primary?    Chronic idiopathic urticaria Yes    Visit for monitoring Xolair therapy     Seasonal allergic rhinitis due to pollen     COVID-19 vaccine series completed     Flu vaccine need        #1 chronic idiopathic urticaria  Currently on Xolair 150 mg  every 8 weeks.  This is her first time with every 8-week intervals.  Mild hives in the interim.  No oral swelling or respiratory issues.  Mild puffy eye on 2 occasions.  Responded to antihistamines including Xyzal  Recommend 1 more trial of Xolair 150 mg at 8-week interval.  If patient remains well controlled we will attempt to stop Xolair.  Trial of Allegra/fexofenadine 180 mg once a day up to 4 times per day if needed  Patient to keep me posted in regards to frequent severity of hives with Xolair dosing at 150 mg every 8 weeks  Reviewed if any worsening of symptoms may increase Xolair back up to 300 mg every 4 weeks  EpiPen renewed    #2 flu vaccine recommended and offered    #3 COVID vaccines reviewed.  Recommend booster.  Reviewed I do not have the COVID-vaccine my office    Over 30 spent on care and visit  Xolair given in office today followed by 30 minutes of observation         Orders This Visit:  No orders of the defined types were placed in this encounter.      Meds This Visit:  Requested Prescriptions      No prescriptions requested or ordered in this encounter       Imaging & Referrals:  None     1/15/2024  Jose Carlos Joshi MD    If medication samples were provided today, they were provided solely for patient education and training related to self administration of these medications.  Teaching, instruction and sample was provided to the patient by myself.  Teaching included  a review of potential adverse side effects as well as potential efficacy.  Patient's questions were answered in regards to medication administration and dosing and potential side effects. Teaching was provided via the teach back method

## 2024-01-15 NOTE — TELEPHONE ENCOUNTER
Disp Refills Start End    EPINEPHrine (EPIPEN 2-BRITTANY) 0.3 MG/0.3ML Injection Solution Auto-injector 1 each 0 1/15/2024 --    Sig: Inject IM in event of  allergic reaction    Sent to pharmacy as: EPINEPHrine 0.3 MG/0.3ML Injection Solution Auto-injector (EpiPen 2-Brittany)    E-Prescribing Status: Receipt confirmed by pharmacy (1/15/2024  2:39 PM CST)    No prior authorization was found for this prescription.    Found prior authorization for another prescription for the same medication: Closed - Other      Pharmacy    [id number] - [facility] [address] [phone], [phone]     Pt requested Epipen get sent to [facility] instead. RN cancelled this medication with pharmacy directly.

## 2024-01-15 NOTE — TELEPHONE ENCOUNTER
Dr. Joshi, May we have an order for patient for Xolair 150 mg for patient as she was seen in the office today.    Patient is weaning off Xolair and last injection was 8 weeks prior to today, 1/15/24.

## 2024-04-15 ENCOUNTER — ORDER TRANSCRIPTION (OUTPATIENT)
Dept: ADMINISTRATIVE | Facility: HOSPITAL | Age: 44
End: 2024-04-15

## 2024-04-15 DIAGNOSIS — Z12.31 ENCOUNTER FOR SCREENING MAMMOGRAM FOR MALIGNANT NEOPLASM OF BREAST: Primary | ICD-10-CM

## 2024-06-01 ENCOUNTER — HOSPITAL ENCOUNTER (OUTPATIENT)
Dept: MAMMOGRAPHY | Age: 44
Discharge: HOME | End: 2024-06-01
Attending: INTERNAL MEDICINE
Payer: COMMERCIAL

## 2024-06-01 DIAGNOSIS — Z12.31 ENCOUNTER FOR SCREENING MAMMOGRAM FOR MALIGNANT NEOPLASM OF BREAST: ICD-10-CM

## 2024-06-01 PROCEDURE — 77063 BREAST TOMOSYNTHESIS BI: CPT | Performed by: INTERNAL MEDICINE

## 2024-06-01 PROCEDURE — 77067 SCR MAMMO BI INCL CAD: CPT | Performed by: INTERNAL MEDICINE

## 2024-06-22 ENCOUNTER — HOSPITAL ENCOUNTER (OUTPATIENT)
Dept: ULTRASOUND IMAGING | Age: 44
Discharge: HOME | End: 2024-06-22
Attending: INTERNAL MEDICINE

## 2024-06-22 DIAGNOSIS — E04.1 THYROID NODULE: ICD-10-CM

## 2024-06-22 PROCEDURE — 76536 US EXAM OF HEAD AND NECK: CPT | Performed by: INTERNAL MEDICINE

## 2024-07-05 ENCOUNTER — TELEPHONE (OUTPATIENT)
Dept: INTERNAL MEDICINE CLINIC | Facility: CLINIC | Age: 44
End: 2024-07-05

## 2024-07-05 DIAGNOSIS — Z00.00 PHYSICAL EXAM, ANNUAL: Primary | ICD-10-CM

## 2024-07-05 DIAGNOSIS — R73.01 ELEVATED FASTING BLOOD SUGAR: ICD-10-CM

## 2024-07-05 DIAGNOSIS — E55.9 VITAMIN D DEFICIENCY: ICD-10-CM

## 2024-07-05 NOTE — TELEPHONE ENCOUNTER
Patient has an upcoming appointment and is requesting lab orders to be placed to have completed prior to appointment.    Orders pended for your completion and approval.    Future Appointments   Date Time Provider Department Center   7/15/2024  9:00 AM Natalia Hernández MD [facility]   10/9/2024  9:00 AM Tyrese Adames MD [facility]

## 2024-07-05 NOTE — TELEPHONE ENCOUNTER
Patient called and is asking if she can get her labs done prior to her appointment on 7/15.   [facility]

## 2024-07-05 NOTE — TELEPHONE ENCOUNTER
Amairani Luke BOLTON --- would you like to add Vitamin D level?     Last Vitamin D level 2/20/23. And patient take over-the-counter vitamin D. If not, patient understands that she can talk to Primary Care Physician (PCP) about this at the appointment.           RN called patient. Patient's date of birth and full name both confirmed.   RN informed of provider's message as detailed .   Advised to fast and stay hydrated with plain wate r  Advised to check multivitamin ingredients and stop taking supplements that are or contain Biotin, 2 days prior to your testing. Biotin may interfere with the accuracy of some tests, especially thyroid tests. She verbalizes understanding of a  ll information, and agreeable to plan.

## 2024-07-08 ENCOUNTER — LAB ENCOUNTER (OUTPATIENT)
Dept: LAB | Facility: HOSPITAL | Age: 44
End: 2024-07-08
Attending: INTERNAL MEDICINE
Payer: COMMERCIAL

## 2024-07-08 DIAGNOSIS — E55.9 VITAMIN D DEFICIENCY: ICD-10-CM

## 2024-07-08 DIAGNOSIS — R73.01 ELEVATED FASTING BLOOD SUGAR: Primary | ICD-10-CM

## 2024-07-08 LAB
ALBUMIN SERPL-MCNC: 4.3 G/DL (ref 3.2–4.8)
ALBUMIN/GLOB SERPL: 1.4 {RATIO} (ref 1–2)
ALP LIVER SERPL-CCNC: 58 U/L
ALT SERPL-CCNC: 20 U/L
ANION GAP SERPL CALC-SCNC: 4 MMOL/L (ref 0–18)
AST SERPL-CCNC: 19 U/L (ref ?–34)
BASOPHILS # BLD AUTO: 0.07 X10(3) UL (ref 0–0.2)
BASOPHILS NFR BLD AUTO: 0.9 %
BILIRUB SERPL-MCNC: 0.9 MG/DL (ref 0.3–1.2)
BUN BLD-MCNC: 9 MG/DL (ref 9–23)
BUN/CREAT SERPL: 11.4 (ref 10–20)
CALCIUM BLD-MCNC: 9.6 MG/DL (ref 8.7–10.4)
CHLORIDE SERPL-SCNC: 104 MMOL/L (ref 98–112)
CHOLEST SERPL-MCNC: 140 MG/DL (ref ?–200)
CO2 SERPL-SCNC: 29 MMOL/L (ref 21–32)
CREAT BLD-MCNC: 0.79 MG/DL
DEPRECATED RDW RBC AUTO: 43.5 FL (ref 35.1–46.3)
EGFRCR SERPLBLD CKD-EPI 2021: 93 ML/MIN/1.73M2 (ref 60–?)
EOSINOPHIL # BLD AUTO: 0.18 X10(3) UL (ref 0–0.7)
EOSINOPHIL NFR BLD AUTO: 2.3 %
ERYTHROCYTE [DISTWIDTH] IN BLOOD BY AUTOMATED COUNT: 14.3 % (ref 11–15)
EST. AVERAGE GLUCOSE BLD GHB EST-MCNC: 114 MG/DL (ref 68–126)
FASTING PATIENT LIPID ANSWER: YES
FASTING STATUS PATIENT QL REPORTED: YES
GLOBULIN PLAS-MCNC: 3 G/DL (ref 2–3.5)
GLUCOSE BLD-MCNC: 111 MG/DL (ref 70–99)
HBA1C MFR BLD: 5.6 % (ref ?–5.7)
HCT VFR BLD AUTO: 40.1 %
HDLC SERPL-MCNC: 44 MG/DL (ref 40–59)
HGB BLD-MCNC: 13.5 G/DL
IMM GRANULOCYTES # BLD AUTO: 0.12 X10(3) UL (ref 0–1)
IMM GRANULOCYTES NFR BLD: 1.6 %
LDLC SERPL CALC-MCNC: 80 MG/DL (ref ?–100)
LYMPHOCYTES # BLD AUTO: 1.79 X10(3) UL (ref 1–4)
LYMPHOCYTES NFR BLD AUTO: 23.2 %
MCH RBC QN AUTO: 28.2 PG (ref 26–34)
MCHC RBC AUTO-ENTMCNC: 33.7 G/DL (ref 31–37)
MCV RBC AUTO: 83.7 FL
MONOCYTES # BLD AUTO: 0.73 X10(3) UL (ref 0.1–1)
MONOCYTES NFR BLD AUTO: 9.5 %
NEUTROPHILS # BLD AUTO: 4.81 X10 (3) UL (ref 1.5–7.7)
NEUTROPHILS # BLD AUTO: 4.81 X10(3) UL (ref 1.5–7.7)
NEUTROPHILS NFR BLD AUTO: 62.5 %
NONHDLC SERPL-MCNC: 96 MG/DL (ref ?–130)
OSMOLALITY SERPL CALC.SUM OF ELEC: 283 MOSM/KG (ref 275–295)
PLATELET # BLD AUTO: 242 10(3)UL (ref 150–450)
POTASSIUM SERPL-SCNC: 4.4 MMOL/L (ref 3.5–5.1)
PROT SERPL-MCNC: 7.3 G/DL (ref 5.7–8.2)
RBC # BLD AUTO: 4.79 X10(6)UL
SODIUM SERPL-SCNC: 137 MMOL/L (ref 136–145)
T4 FREE SERPL-MCNC: 1 NG/DL (ref 0.8–1.7)
TRIGL SERPL-MCNC: 82 MG/DL (ref 30–149)
TSI SER-ACNC: 5.07 MIU/ML (ref 0.55–4.78)
VIT D+METAB SERPL-MCNC: 31.2 NG/ML (ref 30–100)
VLDLC SERPL CALC-MCNC: 13 MG/DL (ref 0–30)
WBC # BLD AUTO: 7.7 X10(3) UL (ref 4–11)

## 2024-07-08 PROCEDURE — 80053 COMPREHEN METABOLIC PANEL: CPT

## 2024-07-08 PROCEDURE — 84443 ASSAY THYROID STIM HORMONE: CPT

## 2024-07-08 PROCEDURE — 85025 COMPLETE CBC W/AUTO DIFF WBC: CPT

## 2024-07-08 PROCEDURE — 36415 COLL VENOUS BLD VENIPUNCTURE: CPT

## 2024-07-08 PROCEDURE — 82306 VITAMIN D 25 HYDROXY: CPT

## 2024-07-08 PROCEDURE — 83036 HEMOGLOBIN GLYCOSYLATED A1C: CPT

## 2024-07-08 PROCEDURE — 84439 ASSAY OF FREE THYROXINE: CPT

## 2024-07-08 PROCEDURE — 80061 LIPID PANEL: CPT

## 2024-07-10 ENCOUNTER — TELEPHONE (OUTPATIENT)
Dept: INTERNAL MEDICINE CLINIC | Facility: CLINIC | Age: 44
End: 2024-07-10

## 2024-07-10 NOTE — TELEPHONE ENCOUNTER
Patient due for her annual physical exam, colonoscopy, pap smear and blood pressure check. Patient last seen in office on 2/20/23, next office visit scheduled on 7/15/24 for physical exam. Care Gap letter generated and sent to patient via [employer].

## 2024-07-15 ENCOUNTER — OFFICE VISIT (OUTPATIENT)
Dept: INTERNAL MEDICINE CLINIC | Facility: CLINIC | Age: 44
End: 2024-07-15
Payer: COMMERCIAL

## 2024-07-15 VITALS
BODY MASS INDEX: 47.29 KG/M2 | HEIGHT: 64 IN | WEIGHT: 277 LBS | TEMPERATURE: 98 F | DIASTOLIC BLOOD PRESSURE: 77 MMHG | HEART RATE: 80 BPM | SYSTOLIC BLOOD PRESSURE: 122 MMHG

## 2024-07-15 DIAGNOSIS — Z00.00 PHYSICAL EXAM, ANNUAL: Primary | ICD-10-CM

## 2024-07-15 DIAGNOSIS — Z01.419 ENCOUNTER FOR ROUTINE GYNECOLOGICAL EXAMINATION WITH PAPANICOLAOU SMEAR OF CERVIX: ICD-10-CM

## 2024-07-15 DIAGNOSIS — R73.01 ELEVATED FASTING BLOOD SUGAR: ICD-10-CM

## 2024-07-15 DIAGNOSIS — E55.9 VITAMIN D DEFICIENCY: ICD-10-CM

## 2024-07-15 DIAGNOSIS — E03.8 SUBCLINICAL HYPOTHYROIDISM: ICD-10-CM

## 2024-07-15 NOTE — PROGRESS NOTES
Sejal Gonzales is a 44 year old female.  Chief Complaint   Patient presents with    Routine Physical    Complete Form     Wellness form        HPI:   Patient comes for her annual physical  C/C annual physical  C/o xolair worked well and then stopped it and has had 2 rxn since feb, hives comes and goes once mn      HISTORY  2023 VISIT    New pt -used to see Dr. Klein  C/C establish care  C/o annual physical   Currently getting allergy shots         PMH  Urticaria - on xolair - sees dr cortez   s/p thyroid lobectomy  Subclinical hypothyroidism- sees endo-  H/o IBS -better after avoiding breakfast and lunch and soda      Lives with  and works as a teacher HS -math - AP calc       Current Outpatient Medications   Medication Sig Dispense Refill    Ergocalciferol (VITAMIN D OR) Take 2,000 Units by mouth daily.      Multiple Vitamins-Minerals (MULTI-VITAMIN/MINERALS) Oral Tab Take 1 tablet by mouth daily.      EPINEPHrine (EPIPEN 2-BRITTANY) 0.3 MG/0.3ML Injection Solution Auto-injector Inject IM in event of  allergic reaction 2 each 0    hydrOXYzine 25 MG Oral Tab Take 1 tablet (25 mg total) by mouth every 4 (four) hours as needed for Itching. 30 tablet 0    levocetirizine (XYZAL) 5 MG Oral Tab Take 1 tablet (5 mg total) by mouth nightly. 30 tablet 5      Past Medical History:    Angioedema    IBS (irritable bowel syndrome)    diet    PONV (postoperative nausea and vomiting)    Visual impairment    contacts, eyeglasses      Past Surgical History:   Procedure Laterality Date    Cholecystectomy  2005    Colonoscopy  2/25/2010    Colonoscopy      Thyroid lobectomy,unilat  01/17/2022    Upper gi endoscopy,exam      Winfred teeth removed        Social History:  Social History     Socioeconomic History    Marital status:    Tobacco Use    Smoking status: Never     Passive exposure: Never    Smokeless tobacco: Never    Tobacco comments:     No Household Smokers.    Vaping Use    Vaping status: Never Used   Substance and  Sexual Activity    Alcohol use: No     Alcohol/week: 0.0 standard drinks of alcohol    Drug use: No    Sexual activity: Yes     Partners: Male   Other Topics Concern    Caffeine Concern Yes     Comment: coffee, 3cups weekly        REVIEW OF SYSTEMS:   GENERAL HEALTH: No fevers, chills, sweats, fatigue  VISION: No recent vision problems, blurry vision or double vision  HEENT: No decreased hearing ear pain nasal congestion or sore throat  SKIN: denies any unusual skin lesions or rashes  RESPIRATORY: denies shortness of breath, cough, wheezing  CARDIOVASCULAR: denies chest pain on exertion, palpitations, swelling in feet  GI: denies abdominal pain and denies heartburn, nausea or vomiting  : No Pain on urination, change in the color of urine, discharge, urinating frequently  MUS: +  back pain- right sided in may stabbing ,no  joint pain or  muscle pain  NEURO: denies headaches , anxiety, depression    EXAM:   /77   Pulse 80   Temp 98.4 °F (36.9 °C) (Oral)   Ht 5' 4\" (1.626 m)   Wt 277 lb (125.6 kg)   LMP 07/01/2024 (Approximate)   BMI 47.55 kg/m²   GENERAL: well developed, well nourished,in no apparent distress  SKIN: no rashes,no suspicious lesions  HEENT: atraumatic, normocephalic,ears and throat are clear, no frontal or maxillary sinus tenderness, pupils equal reactive to light bilaterally, extraocular muscles intact  NECK: supple,no adenopathy,nontender   LUNGS: clear to auscultation, no wheeze  CARDIO: RRR without murmur  GI: good BS's,no masses or tenderness  EXTREMITIES: no cyanosis, or edema  BREAST: Bilateral breasts without any lumps, bilateral axilla without any lymphadenopathy, no nipple retraction or  discharge     external genitalia-normal appearance, hair distribution, no lesions  Urethral meatus-normal in size, location, without lesions or prolapse  Bladder-no fullness, masses or tenderness  Vagina-normal appearance without lesions, no abnormal discharge  Cervix-normal without tenderness on  motion  Uterus-normal in size, contour, position, mobility without tenderness  Adnexa-normal without masses or tenderness  Perineum normal  Rectovaginal-no masses  Anus no hemorrhoids seen    ASSESSMENT AND PLAN:   Diagnoses and all orders for this visit:    Physical exam, annual  -     Comp Metabolic Panel (14); Future  -     Lipid Panel; Future  -     CBC, Platelet; No Differential; Future  -     Hemoglobin A1C; Future    Elevated fasting blood sugar  -     Hemoglobin A1C; Future    Vitamin D deficiency  -     Vitamin D; Future    Subclinical hypothyroidism  -     Cancel: Endocrine Referral - In Network  -     Cancel: Endocrine Referral - In Network  -     Endocrine Referral - In Network    Encounter for routine gynecological examination with Papanicolaou smear of cervix  -     ThinPrep PAP with HPV Reflex Request B; Future      Advised patient to watch what she eats and exercise, seatbelt use no texting driving, sunscreen use advised      Preventive medicine  Colonoscopy- has apt in oct w/ dr patterson   Mammogram - 6/2024  Pap- dr syed and 2021 dr weaver   Labs  1/2022 2/2023 7/2024  reviewed       The patient indicates understanding of these issues and agrees to the plan.  No follow-ups on file.

## 2024-07-17 LAB — HPV E6+E7 MRNA CVX QL NAA+PROBE: NEGATIVE

## 2024-07-18 ENCOUNTER — TELEPHONE (OUTPATIENT)
Dept: ALLERGY | Facility: CLINIC | Age: 44
End: 2024-07-18

## 2024-07-18 NOTE — TELEPHONE ENCOUNTER
Patient's owned Xolair stored in Allergy Refrigerator x2 discarded due to expiration.     Xolair 150 mg/mL prefilled syringe   NDC#: 11284-568-01  Lot #: [id number]  Exp Date: 3/2024    Xolair 150 mg/mL prefilled syringe  NDC#: 76116-611-31  Lot #: [id number]  Exp Date: 7/2024

## 2024-09-12 ENCOUNTER — TELEPHONE (OUTPATIENT)
Dept: ALLERGY | Facility: CLINIC | Age: 44
End: 2024-09-12

## 2024-09-12 NOTE — TELEPHONE ENCOUNTER
Xolair disposed.     Patient supplied Xolair 150 mg/mL prefilled syringe x1  2024.     Lot #: [id number]    NDC#: 58759-925-45

## 2024-10-05 ENCOUNTER — LAB ENCOUNTER (OUTPATIENT)
Dept: LAB | Facility: HOSPITAL | Age: 44
End: 2024-10-05
Attending: INTERNAL MEDICINE
Payer: COMMERCIAL

## 2024-10-05 DIAGNOSIS — E55.9 VITAMIN D DEFICIENCY: ICD-10-CM

## 2024-10-05 DIAGNOSIS — R73.01 ELEVATED FASTING BLOOD SUGAR: ICD-10-CM

## 2024-10-05 DIAGNOSIS — Z00.00 PHYSICAL EXAM, ANNUAL: ICD-10-CM

## 2024-10-05 LAB
ALBUMIN SERPL-MCNC: 4.5 G/DL (ref 3.2–4.8)
ALBUMIN/GLOB SERPL: 1.7 {RATIO} (ref 1–2)
ALP LIVER SERPL-CCNC: 54 U/L
ALT SERPL-CCNC: 9 U/L
ANION GAP SERPL CALC-SCNC: 6 MMOL/L (ref 0–18)
AST SERPL-CCNC: 11 U/L (ref ?–34)
BILIRUB SERPL-MCNC: 0.9 MG/DL (ref 0.3–1.2)
BUN BLD-MCNC: 8 MG/DL (ref 9–23)
BUN/CREAT SERPL: 10.8 (ref 10–20)
CALCIUM BLD-MCNC: 9.3 MG/DL (ref 8.7–10.4)
CHLORIDE SERPL-SCNC: 106 MMOL/L (ref 98–112)
CHOLEST SERPL-MCNC: 134 MG/DL (ref ?–200)
CO2 SERPL-SCNC: 27 MMOL/L (ref 21–32)
CREAT BLD-MCNC: 0.74 MG/DL
DEPRECATED RDW RBC AUTO: 43 FL (ref 35.1–46.3)
EGFRCR SERPLBLD CKD-EPI 2021: 101 ML/MIN/1.73M2 (ref 60–?)
ERYTHROCYTE [DISTWIDTH] IN BLOOD BY AUTOMATED COUNT: 14.1 % (ref 11–15)
EST. AVERAGE GLUCOSE BLD GHB EST-MCNC: 111 MG/DL (ref 68–126)
FASTING PATIENT LIPID ANSWER: YES
FASTING STATUS PATIENT QL REPORTED: YES
GLOBULIN PLAS-MCNC: 2.7 G/DL (ref 2–3.5)
GLUCOSE BLD-MCNC: 93 MG/DL (ref 70–99)
HBA1C MFR BLD: 5.5 % (ref ?–5.7)
HCT VFR BLD AUTO: 40.7 %
HDLC SERPL-MCNC: 42 MG/DL (ref 40–59)
HGB BLD-MCNC: 13.2 G/DL
LDLC SERPL CALC-MCNC: 78 MG/DL (ref ?–100)
MCH RBC QN AUTO: 27.2 PG (ref 26–34)
MCHC RBC AUTO-ENTMCNC: 32.4 G/DL (ref 31–37)
MCV RBC AUTO: 83.7 FL
NONHDLC SERPL-MCNC: 92 MG/DL (ref ?–130)
OSMOLALITY SERPL CALC.SUM OF ELEC: 286 MOSM/KG (ref 275–295)
PLATELET # BLD AUTO: 253 10(3)UL (ref 150–450)
POTASSIUM SERPL-SCNC: 4.4 MMOL/L (ref 3.5–5.1)
PROT SERPL-MCNC: 7.2 G/DL (ref 5.7–8.2)
RBC # BLD AUTO: 4.86 X10(6)UL
SODIUM SERPL-SCNC: 139 MMOL/L (ref 136–145)
TRIGL SERPL-MCNC: 70 MG/DL (ref 30–149)
VIT D+METAB SERPL-MCNC: 30.7 NG/ML (ref 30–100)
VLDLC SERPL CALC-MCNC: 11 MG/DL (ref 0–30)
WBC # BLD AUTO: 6.7 X10(3) UL (ref 4–11)

## 2024-10-05 PROCEDURE — 36415 COLL VENOUS BLD VENIPUNCTURE: CPT

## 2024-10-05 PROCEDURE — 82306 VITAMIN D 25 HYDROXY: CPT

## 2024-10-05 PROCEDURE — 83036 HEMOGLOBIN GLYCOSYLATED A1C: CPT

## 2024-10-05 PROCEDURE — 85027 COMPLETE CBC AUTOMATED: CPT

## 2024-10-05 PROCEDURE — 80053 COMPREHEN METABOLIC PANEL: CPT

## 2024-10-05 PROCEDURE — 80061 LIPID PANEL: CPT

## 2024-10-09 ENCOUNTER — OFFICE VISIT (OUTPATIENT)
Dept: ENDOCRINOLOGY CLINIC | Facility: CLINIC | Age: 44
End: 2024-10-09
Payer: COMMERCIAL

## 2024-10-09 ENCOUNTER — OFFICE VISIT (OUTPATIENT)
Facility: CLINIC | Age: 44
End: 2024-10-09
Payer: COMMERCIAL

## 2024-10-09 ENCOUNTER — TELEPHONE (OUTPATIENT)
Facility: CLINIC | Age: 44
End: 2024-10-09

## 2024-10-09 ENCOUNTER — LAB ENCOUNTER (OUTPATIENT)
Dept: LAB | Facility: HOSPITAL | Age: 44
End: 2024-10-09
Attending: INTERNAL MEDICINE
Payer: COMMERCIAL

## 2024-10-09 VITALS
BODY MASS INDEX: 43.02 KG/M2 | HEART RATE: 70 BPM | HEIGHT: 64 IN | DIASTOLIC BLOOD PRESSURE: 65 MMHG | WEIGHT: 252 LBS | SYSTOLIC BLOOD PRESSURE: 109 MMHG

## 2024-10-09 VITALS
BODY MASS INDEX: 43.02 KG/M2 | HEIGHT: 64 IN | WEIGHT: 252 LBS | SYSTOLIC BLOOD PRESSURE: 121 MMHG | DIASTOLIC BLOOD PRESSURE: 83 MMHG | HEART RATE: 67 BPM

## 2024-10-09 DIAGNOSIS — K21.00 GASTROESOPHAGEAL REFLUX DISEASE WITH ESOPHAGITIS WITHOUT HEMORRHAGE: Primary | ICD-10-CM

## 2024-10-09 DIAGNOSIS — R13.19 ESOPHAGEAL DYSPHAGIA: ICD-10-CM

## 2024-10-09 DIAGNOSIS — E03.8 SUBCLINICAL HYPOTHYROIDISM: ICD-10-CM

## 2024-10-09 DIAGNOSIS — E03.8 SUBCLINICAL HYPOTHYROIDISM: Primary | ICD-10-CM

## 2024-10-09 DIAGNOSIS — E04.1 THYROID NODULE: ICD-10-CM

## 2024-10-09 DIAGNOSIS — Z12.11 ENCOUNTER FOR SCREENING COLONOSCOPY: ICD-10-CM

## 2024-10-09 DIAGNOSIS — Z12.11 COLON CANCER SCREENING: Primary | ICD-10-CM

## 2024-10-09 LAB
T3FREE SERPL-MCNC: 2.84 PG/ML (ref 2.4–4.2)
T4 FREE SERPL-MCNC: 1.1 NG/DL (ref 0.8–1.7)
THYROGLOB SERPL-MCNC: <15 U/ML (ref ?–60)
THYROPEROXIDASE AB SERPL-ACNC: 39 U/ML (ref ?–60)
TSI SER-ACNC: 4.08 MIU/ML (ref 0.55–4.78)

## 2024-10-09 PROCEDURE — 84439 ASSAY OF FREE THYROXINE: CPT

## 2024-10-09 PROCEDURE — 84443 ASSAY THYROID STIM HORMONE: CPT

## 2024-10-09 PROCEDURE — 36415 COLL VENOUS BLD VENIPUNCTURE: CPT

## 2024-10-09 PROCEDURE — 86800 THYROGLOBULIN ANTIBODY: CPT

## 2024-10-09 PROCEDURE — 3008F BODY MASS INDEX DOCD: CPT | Performed by: INTERNAL MEDICINE

## 2024-10-09 PROCEDURE — 99204 OFFICE O/P NEW MOD 45 MIN: CPT | Performed by: INTERNAL MEDICINE

## 2024-10-09 PROCEDURE — 86376 MICROSOMAL ANTIBODY EACH: CPT

## 2024-10-09 PROCEDURE — 3079F DIAST BP 80-89 MM HG: CPT | Performed by: INTERNAL MEDICINE

## 2024-10-09 PROCEDURE — 99214 OFFICE O/P EST MOD 30 MIN: CPT | Performed by: INTERNAL MEDICINE

## 2024-10-09 PROCEDURE — 84481 FREE ASSAY (FT-3): CPT

## 2024-10-09 PROCEDURE — 3074F SYST BP LT 130 MM HG: CPT | Performed by: INTERNAL MEDICINE

## 2024-10-09 PROCEDURE — 3078F DIAST BP <80 MM HG: CPT | Performed by: INTERNAL MEDICINE

## 2024-10-09 RX ORDER — SODIUM, POTASSIUM,MAG SULFATES 17.5-3.13G
SOLUTION, RECONSTITUTED, ORAL ORAL
Qty: 1 EACH | Refills: 0 | Status: SHIPPED | OUTPATIENT
Start: 2024-10-09

## 2024-10-09 NOTE — PROGRESS NOTES
**  SCROLL DOWN FOR HPI **      ASSESSMENT/PLAN:   No diagnosis found.    44 year old woman with elevated BMI, history of irritable bowel syndrome since at least 2010, recent atypical nerve complaints summer 2014.  She was initially referred by Dr. Klein for throat, globus, dysphagia symptoms.  She described waxing and waning cervical area dysphagia symptom since approximately February 2015.  After improving on pantoprazole medication, that is again worsening.  She describes the sensation of swallowed particles of food remaining in her cervical area.    We discussed possibilities of globus sensation and anxiety; structural problem ranging from inflammatory changes due to GERD or esophageal stricture; allergies and eosinophilic esophagitis.  Difficult to assess how much this is interfering in her daily life.  She is aware of this symptom on a daily basis.    Reassuring EGD examination with esophageal and gastric biopsies May 2015 as below.    Now following with the [facility].    She did undergo recent right hemithyroidectomy surgery Dr. Leonides Washburn 1/17/2022 with concern for thyroid nodule; benign pathology 3.4 cm Follicular adenoma.    Ms. Gonzales returns for follow-up 10/9/2024 to update on recent medical concerns and discuss screening colonoscopy examination.      Suggest:    History of thyroid nodules and thyroid resection surgery   s/p recent right hemithyroidectomy surgery Dr. Leonides Washburn 1/17/2022 with concern for thyroid nodule; benign pathology 3.4 cm Follicular adenoma.  Abnormal TSH on recent labs briefly discussed today    Complex allergy history, recurring syndrome hives facial swelling possible angioedema     Following closely with Dr. Jose Carlos Joshi of [facility]    She describes a recurring syndrome of HIVES with swelling of face, eyes, sometimes tongue and lips.  One episode before the Xolair seemed to be threatening throat/swallowing.  She has never had stridor or difficulty  breathing.    No consistent triggers identified  Spicy or acidic meals that provoke GERD symptoms have provoked these hives allergy symptoms;   GERD relieved by Tums; takes Xyzal as below to relieve the hives    These episodes could occur at random, with no typical triggers.  However, she does note that spicy meals, acidic meals provoking GERD symptoms could set off the allergic reaction and hives.    She was on Omalizumab (XOLAIR) 150 MG/ML Subcutaneous Solution Prefilled Syringe from April 2022 onwards.  Just came off the XOLAIR medication approximately February 2024.     Also on Xyzal.  Severe reactions are only broken by prednisone.  Benadryl, other antiallergy meds did not work.    Overall doing well on follow-up visit and updates 10/9/2024    GERD symptoms with occasional dysphagia     Complex allergy history above, mother with reported history of eosinophilic esophagitis has had me previously concern for eosinophilic esophagitis  EGD examination and esophageal biopsies May 2015 reassuring  GERD and dysphagia symptoms were previously relieved by what may have been a brief course of pantoprazole 2015  Not currently taking H2 blocker or PPI medication and acid    Would have low threshold for initiating PPI therapy if any dysphagia symptoms with a occasional GERD symptoms.    Colon cancer screening, average risk        Due for first screening colonoscopy examination.    I recommended and discussed screening colonoscopy examination.  Alternatives including stool testing, imaging briefly discussed.    Consent:    I recommended colonoscopy examination with possible biopsy, possible polypectomy under MAC anesthesia. We discussed the nature and risks of colonoscopy examination including sedation, anesthesia risks; bleeding, colonic injury or perforation, infection.  We discussed the rare occurrence of missed lesions including missed polyps or missed malignancy with colonoscopy examination.  The patient understood  these risks and agrees to proceed.  The need for an accompanying adult to provide a ride home or escort home was also discussed.    Suprep bowel prep if covered; otherwise GoLYTELY bowel prep          Prior to this encounter, I spent over 10 minutes preparing for the visit, including reviewing documents from other specialties as well as from PCP and going over test results. During and after the face to face encounter, I spent an additional 35 minutes today discussing the above and counseling and educating this patient, reviewing chart notes and data and documenting clinical information in the EHR, independently interpreting results and communicating results to the patient/family and composing this comprehensive note, ordering medications or testing, referring and communicating with other healthcare providers, and care coordination with the patient's other providers.      Digital transcription software was utilized to produce this note. The note was proofread for content only. Typographical errors may remain.        Office visit 10/9/2024:   HPI:    Patient ID: Sejal Gonzales is a 44 year old downright beautiful woman with previous history of irritable bowel syndrome reported reassuring colonoscopy examination approximately 2010 who was initially referred by Dr. Klein for GERD symptoms and atypical dysphagia May 2015.      Reassuring EGD examination May 2015 as copied below.  I was worried about possible eosinophilic esophagitis.  Biopsies came back quite bland.    Since our last visit, she did undergo recent right hemithyroidectomy surgery Dr. Leonides Washburn 1/17/2022 with concern for thyroid nodule; benign pathology 3.4 cm Follicular adenoma.    Of note, Ms. Gonzales's mother suffers from severe allergy concerns including apparently eosinophilic esophagitis.    Ms. Gonzales returns today to discuss screening colonoscopy examination.    We reviewed her unusual ongoing allergy concerns.    Over the past few years, she describes  an unusually extreme history of recurring hives and facial swelling.    She has been following with Dr. Jose Carlos Joshi of allergy.  She was on Omalizumab (XOLAIR) 150 MG/ML Subcutaneous Solution Prefilled Syringe from April 2022 onwards.    She describes a recurring syndrome of HIVES with swelling of face, eyes, sometimes tongue and lips.  One episode before the Xolair seemed to be threatening throat/swallowing.  She has never had stridor or difficulty breathing.    These episodes could occur at random, with no typical triggers.  However, she does note that spicy meals, acidic meals provoking GERD symptoms could set off the allergic reaction and hives.    Also on Xyzal.  Severe reactions are only broken by prednisone.  Benadryl, other antiallergy meds did not work.    Just came off the XOLAIR medication approximately February 2024.  She did well through June 2024 with maybe 2 episodes of the hives and swelling.  More of them occurred over the summer which she seems to equate with allergy season.    Today Ms. Gonzales remains off the previous Xolair medication.  She takes Xyzal antiallergy medication on an as-needed basis when she feels the reaction starting.  She actually took a dose this morning.  It can cause severe fatigue which is why she tries to minimize.    We had previously discussed somewhat atypical swallowing symptoms.  She has a long history of intermittent GERD symptoms.  She does state that she has had a few, rare episodes of a few seconds or minutes where she could not swallow.  One such episode occurred after a glass of orange juice sitting down for Margarita breakfast.  She does not believe that she had swallowed anything solid before that.  No subjective food impaction events.     has been diagnosed with type 2 diabetes.  The two have embarked on a very thorough lifestyle and diet change.  He has lost a substantial amount of weight.    With the dietary changes, Ms. Gonzales states that her GERD  symptoms and swallowing are much better.  GERD symptoms currently quiescent.    Due for her for screening colonoscopy examination.  On review of systems, Ms. Gonzales describes previous concern for IBS-diarrhea, otherwise no recent bleeding or alarming symptoms.  Her father had colon polyps removed on at least one of his colonoscopy examinations.    Never smoker.    Other history significant for partial thyroidectomy due to thyroid nodule, subsequent abnormal thyroid functions      Recent labs 10/5/2024 reassuring:  Normal CBC, hemoglobin 13.2g normocytic  Normal renal function  AST 11 ALT 9 alk phosphatase 54    Total cholesterol 134, serum triglycerides 70    Recent TSH 5.068 on 7/8/2024    ======================  Previous visit 5/1/2015:     eSjal Gonzales is a woman with stated history of irritable bowel syndrome referred by Dr. Klein for GERD symptoms and atypical dysphagia.  She volunteers a previous history of irritable bowel syndrome.    Ms. Gonzales saw Dr. Klein in February with concern for GERD symptoms and a vague dysphagia complaint.  Initially, she attributed her symptoms to swallowing a very hot food or liquid bolus, feeling as if she had burned her throat.  Since then, she was more aware of burning acid reflux, GERD symptoms in January and February 2015 and was aware of the sensation of food sticking or slowing in the cervical area of her esophagus.  She points to the area of the thyroid gland.  She states that swallowing gives the sensation of \"something there\".  Sometimes this feels like a swallowed particle of food has gotten stuck and needs to be coughed back out.    No joie impaction events.  Solid meats have not been troubling her.    Dr. Klein prescribed pantoprazole February 10th 2015.  She took that regularly at first and now has been somewhat patchy in her compliance.  The globus, dysphagia complaint resolved by sometime in March.  The GERD symptoms actually continued.    The vague dysphagia,  globus complaint resumed a few weeks ago.  It waxes and wanes.  Not constant.  Some days this pain seems to be gone and other days it is very troubling.    Previous history of irritable bowel syndrome.  She was evaluated with colonoscopy examination 2010.  Of uncertain relation, she was suffering neuropathic symptoms in summer of 2014.  She had the sensation of \"pinprick\" discomfort all over her body.  This was evaluated with testing including MRI scan.  She was advised this could be due to stress and with changes in lifestyle and stress management this problem has resolved.    On social history, Ms. Gonzales does not smoke, has never smoked.  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  No major changes in her stress levels this year.  She will be off this summer.    HPI    Review of Systems        ========================    [facility]    [facility]    PHYSICIAN:  Tyrese Adames M.D.     Operative Report    DATE OF PROCEDURE: May 7, 2015    PREOPERATIVE DIAGNOSIS:  Gastroesophageal reflux disease and dysphagia symptoms.       POSTOPERATIVE DIAGNOSIS:  See impression.       PROCEDURES:  Esophagogastroduodenoscopy.       SURGEON:  Tyrese Adames M.D.       ANESTHESIA:  MAC anesthesia provided by the anesthesia service for this 35-year-old woman with anxiety, anticipated intolerance of exam under conscious sedation drugs.     EGD PROCEDURE:  After the nature and risks of EGD examination under MAC anesthesia were discussed with Ms. Gonzales and her questions answered, her informed consent was obtained.  She was interviewed and examined, sedated by the anesthesiologist and nurse anesthetist.  Once sedated, the Olympus adult diagnostic gastroscope was placed in the patient's mouth and advanced under direct visualization through the oropharynx into the esophagus down through the GE junction, stomach, pylorus, duodenal bulb, and second, third portions of the  duodenum.  Retroflexion was performed in the stomach.       EGD FINDINGS:  There were subtle inflammatory changes in the esophagus diffusely from the upper esophageal sphincter down to the lower esophageal sphincter.  Fine linear furrows were seen.  No typical crepe paper appearance and no stricture was seen.  At least three biopsies were taken of the esophageal mucosa.  Leathery fibrotic esophageal mucosa on biopsies.  From here, normal GE junction.  The proximal stomach including cardia and fundus were normal.  The body of the stomach was normal.  There were inflammatory changes in the gastric antrum with patchy, nodular areas of erythema and inflammatory change.  Multiple biopsies were taken of inflammatory changes in the gastric antrum.  Further in, the pylorus, duodenal bulb, second and third portion of the duodenum were healthy and normal.  The scope was drawn back to the stomach, air and secretions were suctioned out, and the scope was withdrawn from the patient.  She tolerated this procedure well.        IMPRESSION:    1.  Subtle inflammatory changes coupled with fibrotic texture on biopsies seen diffusely in the esophagus concerning for reflux change, possibly allergies and eosinophilic esophagitis.  Biopsies were obtained as above.    2.  Nonspecific inflammatory changes in the gastric mucosa in this patient taking PPI medication.  Biopsies were obtained to evaluated for H. pylori infection.       RECOMMENDATIONS:    1.  Followup above esophageal and gastric biopsy results.    2.  Continue PPI medication, both for possibility of GERD and eosinophilic esophagitis.        Electronically Approved by:  Tyrese Adames M.D., CB  cc: Divina Klein([employer])    TID: [id number]  DD: 05/07/2015 9:28:33 AM    PATH:    FINAL DIAGNOSIS                      A. Esophagus; biopsy:    *  Fragments of mildly hyperplastic squamous esophageal mucosa showing  features compatible with mild to moderate chronic reflux  esophagitis.    *  No evidence of viral inclusions, epithelioid granulomas, markedly increased  intraepithelial eosinophils (up to 2 eosinophils per high power field),  intestinal metaplasia, epithelial dysplasia, or malignancy identified.    *  No evidence of tissue-invasive fungal organisms, parasitic organisms, or  bacterial organisms consistent with Helicobacter species seen on Giemsa stain  (with appropriate control reactivity).     B. Stomach; biopsy:    *  Fragments of gastric mucosa demonstrating mild chronic gastritis with areas  of foveolar hyperplasia, focal smooth muscle hyperplasia, and mild edema  consistent with reactive/chemical gastropathy.    *  No evidence of intestinal metaplasia, epithelial dysplasia, or malignancy  identified.    *  No evidence of bacterial organisms consistent with Helicobacter species  seen on Giemsa stain (with appropriate control reactivity).        TISSUES SUBMITTED                 A. Esophagus, biopsy  B. Stomach, biopsy          Current Outpatient Medications   Medication Sig Dispense Refill    Ergocalciferol (VITAMIN D OR) Take 2,000 Units by mouth daily.      Multiple Vitamins-Minerals (MULTI-VITAMIN/MINERALS) Oral Tab Take 1 tablet by mouth daily.      EPINEPHrine (EPIPEN 2-BRITTANY) 0.3 MG/0.3ML Injection Solution Auto-injector Inject IM in event of  allergic reaction 2 each 0    hydrOXYzine 25 MG Oral Tab Take 1 tablet (25 mg total) by mouth every 4 (four) hours as needed for Itching. 30 tablet 0    levocetirizine (XYZAL) 5 MG Oral Tab Take 1 tablet (5 mg total) by mouth nightly. 30 tablet 5     Allergies:  Allergies   Allergen Reactions    Mucinex Dm Maximum Strength [Dextromethorphan-Guaifenesin] HIVES and SWELLING    Adhesive Tape RASH     bandaids cause rash, paper tape ok      PHYSICAL EXAM:   Physical Exam         No orders of the defined types were placed in this encounter.      Meds This Visit:  Requested Prescriptions      No prescriptions requested or ordered  in this encounter       Imaging & Referrals:  None     ID#[id number]

## 2024-10-09 NOTE — TELEPHONE ENCOUNTER
Scheduled for:  Colonoscopy [id number]  Provider Name:  Dr. Adames  Date:  02/12/2025  Location:[facility]  Sedation:  MAC  Time: (pt is aware that [facility] will call the day before to confirm arrival time)    Prep:  Suprep or Goyltle  Meds/Allergies Reconciled?:  Dr Adames  Diagnosis with codes:  colon cancer screening Z12.11  Was patient informed to call insurance with codes (Y/N):  Yes  Referral sent?:  Referral was sent at the time of electronic surgical scheduling.  EM or EOS notified?:  I sent an electronic request to Endo Scheduling and received a confirmation today.  Medication Orders:  Pt is aware to NOT take iron pills, herbal meds and diet supplements for 7 days before exam. Also to NOT take any form of alcohol, recreational drugs and any forms of ED meds 24 hours before exam.   Misc Orders:       Further instructions given by staff:  I provide prep instructions to patient at the time of the appointment and reviewed date, time and location, she verbalized that she understood and is aware to call if she has any questions.

## 2024-10-09 NOTE — PROGRESS NOTES
Name: Sejal Gonzales  Date: 10/09/24    Referring Physician: Natalia Hernández    Chief Complaint   Patient presents with    Hypothyroidism     Follow up        HISTORY OF PRESENT ILLNESS   Sejal Gonzales is a 44 year old female who presents for   Chief Complaint   Patient presents with    Hypothyroidism     Follow up      This is a 44 year-old woman here for follow-up of management of subclinical hypothyroidism as well as multiple thyroid nodules.     She has a history of right hemithyroidectomy.     Fatigue- none  Constipation/ Hyperdefaecation- she has IBS, but earlier this year, she has had problems with constipation- this has improved with incraesed fiber intake  Depressed Mood/ Anxiety- none  Weight Gain/ Weight Loss- none  Dry Skin/ Moist Skin, Increased perspiration- none, but she has dry skin in the winter time  Brittle Hair/ Hair Fall- none  Cold/ Heat Intolerance- none  Menstrual Irregularities- none  Palpitations- none  Insomnia- none  Tremor- none  Myopathy- none      Family history of thyroid cancer/ conditions- none  Personal history of radiation to the head or neck- none    At the last visit, we had elected to follow TSH as well as thyroid US.    Medical History:   Past Medical History:    Angioedema    IBS (irritable bowel syndrome)    diet    PONV (postoperative nausea and vomiting)    Visual impairment    contacts, eyeglasses       Surgical History:   Past Surgical History:   Procedure Laterality Date    Cholecystectomy  2005    Colonoscopy  2/25/2010    Colonoscopy      Thyroid lobectomy,unilat  01/17/2022    Upper gi endoscopy,exam      [place] teeth removed         Family History:  Family History   Problem Relation Age of Onset    Heart Disorder Father         pacemaker    Colon Polyps Father     Breast Cancer Mother 70    Breast Cancer Maternal Grandmother 70    Cancer Maternal Grandmother 70        bone    Diabetes Maternal Grandmother     Cancer Maternal Grandfather         lung -smoker (cause of  death)    Heart Disease Maternal Grandfather     Heart Disease Paternal Grandfather     Diabetes Paternal Grandfather     Colon Cancer Neg        Social History:   Social History     Socioeconomic History    Marital status:    Tobacco Use    Smoking status: Never     Passive exposure: Never    Smokeless tobacco: Never    Tobacco comments:     No Household Smokers.    Vaping Use    Vaping status: Never Used   Substance and Sexual Activity    Alcohol use: No     Alcohol/week: 0.0 standard drinks of alcohol    Drug use: No    Sexual activity: Yes     Partners: Male   Other Topics Concern    Caffeine Concern Yes     Comment: coffee, 3cups weekly       Medications:     Current Outpatient Medications:     Na Sulfate-K Sulfate-Mg Sulf (SUPREP BOWEL PREP KIT) 17.5-3.13-1.6 GM/177ML Oral Solution, Take as directed, Disp: 1 each, Rfl: 0    Ergocalciferol (VITAMIN D OR), Take 2,000 Units by mouth daily., Disp: , Rfl:     Multiple Vitamins-Minerals (MULTI-VITAMIN/MINERALS) Oral Tab, Take 1 tablet by mouth daily., Disp: , Rfl:     EPINEPHrine (EPIPEN 2-BRITATNY) 0.3 MG/0.3ML Injection Solution Auto-injector, Inject IM in event of  allergic reaction, Disp: 2 each, Rfl: 0    hydrOXYzine 25 MG Oral Tab, Take 1 tablet (25 mg total) by mouth every 4 (four) hours as needed for Itching., Disp: 30 tablet, Rfl: 0    levocetirizine (XYZAL) 5 MG Oral Tab, Take 1 tablet (5 mg total) by mouth nightly., Disp: 30 tablet, Rfl: 5     Allergies:   Allergies   Allergen Reactions    Mucinex Dm Maximum Strength [Dextromethorphan-Guaifenesin] HIVES and SWELLING    Adhesive Tape RASH     bandaids cause rash, paper tape ok       REVIEW OF SYSTEMS  Eyes: no change in vision  Neurologic: no headache, generalized or focal weakness or numbness.  Head: normal  ENT: normal  Lungs: no shortness of breath, wheezing or OLIVEIRA  Cardiovascular:  no chest pain or palpitations  Gastrointestinal:  no abdominal pain, bowel movement problems  Musculoskeletal: no muscle  pain or arthralgia  /Gyne: no frequency or discomfort while urinating  Psychiatric:  no acute distress, anxiety  or depression  Skin: normal moisturized skin    PHYSICAL EXAMINATION:  /65   Pulse 70   Ht 5' 4\" (1.626 m)   Wt 252 lb (114.3 kg)   LMP 07/01/2024 (Approximate)   BMI 43.26 kg/m²     General Appearance:  Alert, in no acute distress, well developed  Eyes: normal conjunctivae, sclera.  Ears/Nose/Mouth/Throat/Neck:  normal hearing, normal speech and normal thyroid gland   Neuro:  sensory grossly intact and motor grossly intact  Psychiatric:  oriented to time, self, and place  Nutritional:  no abnormal weight gain or loss    Data:  Reviewed        ASSESSMENT/PLAN: This is a 44 year-old woman with a history of right pauline-thyroidectomy and multiple nodules in the left lobe of the thyroid, here for follow-up of management of subclinical hypothyroidism as well as the nodules. She is currently asymptomatic. I would like to recheck her levels now and repeat the thyroid US in one year.     I will follow up with patient once test results are back.    Return to clinic in 1 year    Prior to this encounter, I spent over 15 minutes with preparing for the visit, including reviewing documents from other specialties as well as from PCP and going over test results and imaging studies. During the face to face encounter, I spent an additional 15 minutes which were determined for follow-up. Greater than 50% of the time was spent in counseling, anticipatory guidance, and coordination of care. Patient concerns were answered to the best of my knowledge.           10/09/24  Nicci White MD

## 2024-10-09 NOTE — PATIENT INSTRUCTIONS
GI schedulers -    Please schedule colonoscopy exam at [facility] ([facility])    BMI Readings from Last 1 Encounters:   10/09/24 43.26 kg/m²     MAC anesthesia     BP Readings from Last 5 Encounters:   10/09/24 121/83   07/15/24 122/77   01/15/24 (!) 166/99   11/20/23 137/88   10/02/23 120/76       Suprep small volume bowel prep if covered by insurance, otherwise   Golytely (PEG) 4L bowel prep  Rx sent in to [facility]      DX = screening colonoscopy examination    Medication instructions:    None

## 2024-12-22 ENCOUNTER — TELEPHONE (OUTPATIENT)
Dept: ENDOCRINOLOGY CLINIC | Facility: CLINIC | Age: 44
End: 2024-12-22

## 2025-01-06 ENCOUNTER — TELEMEDICINE (OUTPATIENT)
Dept: ALLERGY | Facility: CLINIC | Age: 45
End: 2025-01-06
Payer: COMMERCIAL

## 2025-01-06 ENCOUNTER — TELEPHONE (OUTPATIENT)
Dept: ALLERGY | Facility: CLINIC | Age: 45
End: 2025-01-06

## 2025-01-06 DIAGNOSIS — L50.9 URTICARIA: ICD-10-CM

## 2025-01-06 DIAGNOSIS — J30.9 ALLERGIC RHINITIS, UNSPECIFIED SEASONALITY, UNSPECIFIED TRIGGER: Primary | ICD-10-CM

## 2025-01-06 DIAGNOSIS — Z23 FLU VACCINE NEED: ICD-10-CM

## 2025-01-06 DIAGNOSIS — T78.3XXA ANGIOEDEMA, INITIAL ENCOUNTER: ICD-10-CM

## 2025-01-06 DIAGNOSIS — T78.3XXD ANGIOEDEMA, SUBSEQUENT ENCOUNTER: ICD-10-CM

## 2025-01-06 DIAGNOSIS — L50.1 CHRONIC IDIOPATHIC URTICARIA: Primary | ICD-10-CM

## 2025-01-06 DIAGNOSIS — Z23 NEED FOR COVID-19 VACCINE: ICD-10-CM

## 2025-01-06 PROCEDURE — 98006 SYNCH AUDIO-VIDEO EST MOD 30: CPT | Performed by: ALLERGY & IMMUNOLOGY

## 2025-01-06 RX ORDER — PREDNISONE 10 MG/1
TABLET ORAL
Qty: 38 TABLET | Refills: 0 | Status: SHIPPED | OUTPATIENT
Start: 2025-01-06

## 2025-01-06 NOTE — TELEPHONE ENCOUNTER
LOV 01/15/2024    Pt reports eye swelling started  evening. Left eye. Lasted two days.   Took Xyzal immediately four tablets.   Continued the 2nd and 3rd. By that afternoon it was gone. Continued to take Xyzal daily in effort to prevent it from returning.     Patient discontinued Xolair last 2024.      afternoon around 11 am the right eye started to swell. Took another four Xyzal and it just continuance to get worse. Cheek is swollen today, eye is swollen shut. Limited vision in the right eye.   No lip or tongue swelling.   No eye drainage. No redness.     0900 last dose of Xyzal this morning.   Pt scheduled for a follow up with Dr. Joshi. Virtual visit scheduled for 130 pm today.   Pt to contact Dr. Hernández's office for a new referral. RN contacted Dr. Hernández for referral as well.   RN advised should any tongue or lip swelling start, she must present to ER immediately. Pt's Epipen is .   Patient agreeable to plan of care.

## 2025-01-06 NOTE — TELEPHONE ENCOUNTER
Patient called said she use to take xolair feels like allergies has returned said she is having allergic reaction right eye is swollen, wants to know what medication she should take or if she should make appointment to see

## 2025-01-06 NOTE — PROGRESS NOTES
Sejal Gonzales is a 44 year old female.    HPI:   No chief complaint on file.    Patient is a 44-year-old female who presents via video visit for acute symptoms of eye swelling.    This visit is conducted using Telemedicine with live, interactive video and audio during this Coronavirus pandemic.     Please note that the following visit was completed using two-way, real-time interactive audio and/or video communication.  This has been done in good nivia to provide continuity of care in the best interest of the provider-patient relationship, due to the ongoing public health crisis/national emergency and because of restrictions of visitation.  There are limitations of this visit as no physical exam could be performed.  Every conscious effort was taken to allow for sufficient and adequate time.  This billing was spent on reviewing labs, medications, radiology tests and decision making.  Appropriate medical decision-making and tests are ordered as detailed in the plan of care below     Patient last seen by me in January 2024  Briefly from below from last visit  Patient has history of chronic idiopathic urticaria and allergic rhinitis        Medications include Xolair Atarax EpiPen  COVID-vaccine x 3 doses on record  No flu vaccine on record for this fall      from 1/2024  \"Ciu   Active or persistent symptoms:  hives this past week   Last xolair was 8 weeks ago   Facial swelling 2x since last xolair  11/27/23 + eye swelling, took xyzal , better within 24 hrs  Dec 13 3023:  2am , puffy eye, xyzal , lasted 2 days with xyzal   ED visits or prednisone in the interim denies  Active meds: Xolair 150  mg every 4 weeks, Atarax, xyzal   Last xolair was 8 weeks ago (1 st time at 8 weeks)   No prior allegra   No ed epi or oral swelling         Ar:  Active or persistent symptoms denies current symptoms  Active meds Claritin or Zyrtec as needed  pets : denies   2 dogs \"      At last visit patient was successfully discontinued off of  Xolair.    Today patient reports acute eye swelling    CIU  Active or persistent symptoms: Eye swelling  ED visits or prednisone in the interim: denies   Active meds: Xyzal  Denies oral swelling or respiratory issues  Started 1/1/24 in evening   Lasted 2 days with xyzal 5 mg qid  Right eye swelling yesterday   Still xyzal qid   No ed visits  Off xolair x 1 yr  No new meds , bites, stings ,or infections   No hives with eye swelling  Some mild int hives over past year       HISTORY:  Past Medical History:    Angioedema    IBS (irritable bowel syndrome)    diet    PONV (postoperative nausea and vomiting)    Visual impairment    contacts, eyeglasses      Past Surgical History:   Procedure Laterality Date    Cholecystectomy  2005    Colonoscopy  2/25/2010    Colonoscopy      Thyroid lobectomy,unilat  01/17/2022    Upper gi endoscopy,exam      Leona teeth removed        Family History   Problem Relation Age of Onset    Heart Disorder Father         pacemaker    Colon Polyps Father     Breast Cancer Mother 70    Breast Cancer Maternal Grandmother 70    Cancer Maternal Grandmother 70        bone    Diabetes Maternal Grandmother     Cancer Maternal Grandfather         lung -smoker (cause of death)    Heart Disease Maternal Grandfather     Heart Disease Paternal Grandfather     Diabetes Paternal Grandfather     Colon Cancer Neg       Social History:   Social History     Socioeconomic History    Marital status:    Tobacco Use    Smoking status: Never     Passive exposure: Never    Smokeless tobacco: Never    Tobacco comments:     No Household Smokers.    Vaping Use    Vaping status: Never Used   Substance and Sexual Activity    Alcohol use: No     Alcohol/week: 0.0 standard drinks of alcohol    Drug use: No    Sexual activity: Yes     Partners: Male   Other Topics Concern    Caffeine Concern Yes     Comment: coffee, 3cups weekly        Medications (Active prior to today's visit):  Current Outpatient Medications    Medication Sig Dispense Refill    predniSONE 10 MG Oral Tab Take 40mg q day x 5 days,then 30mg x 3 days,then 20 mg x 3 days,then10 mg x 3 days with food 38 tablet 0    Na Sulfate-K Sulfate-Mg Sulf (SUPREP BOWEL PREP KIT) 17.5-3.13-1.6 GM/177ML Oral Solution Take as directed 1 each 0    Ergocalciferol (VITAMIN D OR) Take 2,000 Units by mouth daily.      Multiple Vitamins-Minerals (MULTI-VITAMIN/MINERALS) Oral Tab Take 1 tablet by mouth daily.      EPINEPHrine (EPIPEN 2-BRITTANY) 0.3 MG/0.3ML Injection Solution Auto-injector Inject IM in event of  allergic reaction 2 each 0    hydrOXYzine 25 MG Oral Tab Take 1 tablet (25 mg total) by mouth every 4 (four) hours as needed for Itching. 30 tablet 0    levocetirizine (XYZAL) 5 MG Oral Tab Take 1 tablet (5 mg total) by mouth nightly. 30 tablet 5       Allergies:  Allergies[1]      ROS:   Allergic/Immuno:  See hpi  Cardiovascular:  Negative for irregular heartbeat/palpitations, chest pain, edema  Constitutional:  Negative night sweats,weight loss, irritability and lethargy  ENMT:  Negative for ear drainage, hearing loss and nasal drainage  Eyes:  Negative for eye discharge and vision loss  Gastrointestinal:  Negative for abdominal pain, diarrhea and vomiting  Integumentary: see hpi   Respiratory:  Negative for cough, dyspnea and wheezing    PHYSICAL EXAM:   Constitutional: responsive, no acute distress noted  Head/Face: NC/Atraumatic  Positive visible right eye swelling of the upper lid.  Speaking in full sentences no increased work of breathing  A&O x 3     ASSESSMENT/PLAN:   Assessment   Encounter Diagnoses   Name Primary?    Chronic idiopathic urticaria Yes    Angioedema, initial encounter     Flu vaccine need     Need for COVID-19 vaccine        #1 chronic idiopathic urticaria with angioedema  Acute flare of eye swelling over the past few days.  Currently using antihistamines including Xyzal up to 4 times per day.  Denies oral swelling or respiratory issues  Start  prednisone 40 mg once a day with food x 5 days.  If swelling is resolved within 5 days then may stop prednisone.  If not please continue with the taper over the next 2 weeks.  Please see prescription below for instructions  Continue with Xyzal, levocetirizine 5 mg 4 times per day.  If no further hives or swelling after completing the prednisone then may decrease to Xyzal by 1 dose every 3 to 5 days.      #2 flu vaccine recommended and offered    #3 COVID-vaccine boosters reviewed.  Recommend booster.  Please check with local pharmacy as we do not stock the booster.  Most recent boosters in September 2024         Orders This Visit:  No orders of the defined types were placed in this encounter.      Meds This Visit:  Requested Prescriptions     Signed Prescriptions Disp Refills    predniSONE 10 MG Oral Tab 38 tablet 0     Sig: Take 40mg q day x 5 days,then 30mg x 3 days,then 20 mg x 3 days,then10 mg x 3 days with food       Imaging & Referrals:  None     1/6/2025  Jose Carlos Joshi MD    If medication samples were provided today, they were provided solely for patient education and training related to self administration of these medications.  Teaching, instruction and sample was provided to the patient by myself.  Teaching included  a review of potential adverse side effects as well as potential efficacy.  Patient's questions were answered in regards to medication administration and dosing and potential side effects. Teaching was provided via the teach back method           [1]   Allergies  Allergen Reactions    Mucinex Dm Maximum Strength [Dextromethorphan-Guaifenesin] HIVES and SWELLING    Adhesive Tape RASH     bandaids cause rash, paper tape ok

## 2025-01-06 NOTE — TELEPHONE ENCOUNTER
Call noted.  Patient has video visit later today at 130.  Agree with triage advice provided.  Prior Xolair in the past.  None current

## 2025-01-06 NOTE — TELEPHONE ENCOUNTER
Verified name and .    Patient states that she has appointment scheduled with Dr. Joshi today but needs to ensure that she has active referral:  Future Appointments   Date Time Provider Department Center   2025  1:30 PM [facility]   2025  1:30 PM ADALGISA, PROCEDURE [id number] None       Upon review of chart, Dr. Hernández placed order for referral today 25, however status is still \"OPEN\".    Call transferred to [facility] [phone].    Routing to [facility] for assistance.

## 2025-01-06 NOTE — PATIENT INSTRUCTIONS
#1 chronic idiopathic urticaria with angioedema  Acute flare of eye swelling over the past few days.  Currently using antihistamines including Xyzal up to 4 times per day.  Denies oral swelling or respiratory issues  Start prednisone 40 mg once a day with food x 5 days.  If swelling is resolved within 5 days then may stop prednisone.  If not please continue with the taper over the next 2 weeks.  Please see prescription below for instructions  Continue with Xyzal, levocetirizine 5 mg 4 times per day.  If no further hives or swelling after completing the prednisone then may decrease to Xyzal by 1 dose every 3 to 5 days.      #2 flu vaccine recommended and offered    #3 COVID-vaccine boosters reviewed.  Recommend booster.  Please check with local pharmacy as we do not stock the booster.  Most recent boosters in September 2024         Orders This Visit:  No orders of the defined types were placed in this encounter.      Meds This Visit:  Requested Prescriptions     Signed Prescriptions Disp Refills    predniSONE 10 MG Oral Tab 38 tablet 0     Sig: Take 40mg q day x 5 days,then 30mg x 3 days,then 20 mg x 3 days,then10 mg x 3 days with food

## 2025-01-11 ENCOUNTER — PATIENT MESSAGE (OUTPATIENT)
Dept: ALLERGY | Facility: CLINIC | Age: 45
End: 2025-01-11

## 2025-01-13 NOTE — TELEPHONE ENCOUNTER
Dr. Joshi please see pt's [employer] message listed below:    Sejal SALINAS  Allergy Clinical Staff (supporting Jose Carlos Joshi MD)2 days ago       Hi,    Dr Joshi wanted me to send a message & give an update on how I was doing with my swelling I had this past Monday. The swelling was totally gone yesterday morning (Friday) with still a little bit of redness on my right cheek that was where I had the swelling. This morning I still see no swelling at all, and my cheek is just a tiny bit red…but could just be my skin (I usually have redness as it is). I feel fine, and I don’t feel any other kind of swelling that might be happening. I was going to wait until tonight to make the decision on whether or not to do the Prednisone taper period.  Any input would be welcome.  Thank you all so much! :)

## 2025-01-13 NOTE — TELEPHONE ENCOUNTER
If symptoms are manageable after 5 days of prednisone then continue with Zyrtec or Xyzal up to 4 times per day.  If still room for improvement then continue prednisone taper

## 2025-01-15 ENCOUNTER — TELEPHONE (OUTPATIENT)
Dept: ALLERGY | Facility: CLINIC | Age: 45
End: 2025-01-15

## 2025-01-15 DIAGNOSIS — L50.1 CHRONIC IDIOPATHIC URTICARIA: Primary | ICD-10-CM

## 2025-01-15 RX ORDER — EPINEPHRINE 0.3 MG/.3ML
INJECTION SUBCUTANEOUS
Qty: 2 EACH | Refills: 0 | Status: SHIPPED | OUTPATIENT
Start: 2025-01-15

## 2025-01-15 NOTE — TELEPHONE ENCOUNTER
Spoke with patient. Verified name and date of birth. Patient states last evening her left lower lid was swelling therefore she took 2 Xyzal and by later in the evening still had some swelling  therefore took 2 more tablets of Xyzal. Patient states this morning her left eye looks more purplish red kind of baggy looking therefore she took Prednisone 40 mg. Patient states her throat feels like it is a little difficult to swallow. Patient denies hives.     Informed patient take Benadryl if needed and if her throat feels more  difficult to swallow to use her EpiPen and call 911 and go to the ER.     Informed patient she may need to be seen in ER and will forward this message to Dr. Joshi. Patient verbalizes understanding.    Last office visit 1/6/25.

## 2025-01-15 NOTE — TELEPHONE ENCOUNTER
Patient woke up with a swollen eye this morning, and it feels funny when she swallows. The patient already took a prednisone 40 mg pill this morning.

## 2025-01-15 NOTE — TELEPHONE ENCOUNTER
RN called pt to make sure pt understood Prednisone taper.  She reports that she is picking up her Epipen now at the pharmacy just in case.  States her throat is feeling much better. Speaking in clear, complete sentences.  Denies any trouble swallowing at this time.      She reports that she would like to come in this Saturday to fill out Xolair enrollment forms just in case Xolair is needed.  Per Dr. Joshi, if symptoms still persist after using steroids and antihistamines, then may re-start Xoliar.    Routed to Dr. Joshi as FYI.

## 2025-01-15 NOTE — TELEPHONE ENCOUNTER
Spoke with patient. Informed patient of Dr. Joshi's recommendations see below. Patient verbalizes understanding.    Patient states the prescription that was sent on 25 with a tapering schedule she only took 5 days of Prednisone 40 mg.  States she did not use the tapering dosing.    Patient states if she needs to do the tapering dosing, she does not have enough to get through. She took 40 mg of Prednisone today and only has 14 pills left and is asking for a refill and if she needs to taper or just take 40 mg for 5 days?     Patient states her EpiPen has - informed patient will refill as last office visit was 25. Patient verbalizes understanding.

## 2025-01-15 NOTE — TELEPHONE ENCOUNTER
Call noted.  Patient should approximately 14 pills of 10 mg left.  Would recommend to take 40 mg for the next 2 days and then 30 mg once a day for the next 2 days after that

## 2025-01-15 NOTE — TELEPHONE ENCOUNTER
Call noted.  The patient is already on prednisone and antihistamines up to 4 times per day and still having concern for throat issues would recommend urgent care or ED evaluation.  If symptoms persist would recommend consider getting back on Xolair again

## 2025-01-15 NOTE — TELEPHONE ENCOUNTER
Patient called and asked if she needs to make an appointment to start the paperwork for going back on Xolair?

## 2025-01-20 ENCOUNTER — PATIENT MESSAGE (OUTPATIENT)
Dept: ALLERGY | Facility: CLINIC | Age: 45
End: 2025-01-20

## 2025-01-20 NOTE — TELEPHONE ENCOUNTER
Dr. Joshi please see pt's update:  Pt completed Xolair enrollment forms on saturday morning in case Xolair is needed in the future    Sejal SALINAS Em Allergy Clinical Staff (supporting Jose Carlos Joshi MD)27 minutes ago (12:27 PM)       Hi! Just wanted to give an update…Today is Monday & I am feeling fine and all the swelling is gone. I have not had any swelling since Thursday morning. I finished taking the last of the Prednisone yesterday (30mg). I have been taking 4 Xyzal daily as well. Thank you!

## 2025-01-20 NOTE — TELEPHONE ENCOUNTER
RN sent [employer] message with Dr. Joshi's recommendations to pt via reply to her [employer] message.

## 2025-01-21 ENCOUNTER — PATIENT MESSAGE (OUTPATIENT)
Dept: ALLERGY | Facility: CLINIC | Age: 45
End: 2025-01-21

## 2025-01-21 NOTE — TELEPHONE ENCOUNTER
RN called to patient   Went over information listed below    Patient to be restarted on Xolair - patient filled out application on Saturday  Staff working on getting it approved    Patient aware if worsening symptoms to report to urgent care/ER for further evaluation and treatment     Routed to Dr. Joshi as ROCIO

## 2025-01-21 NOTE — TELEPHONE ENCOUNTER
Call noted.  May start taking Xyzal again this morning 1 pill every 4-6 hours.  If worsening symptoms recommend urgent care evaluation   Is patient willing to restart Xolair?  Is that would be my next recommendation

## 2025-01-21 NOTE — TELEPHONE ENCOUNTER
Would you like patient to wait 2 hours after Xolair sample or is 30 minutes ok?  Looks like last injection was 1/15/24 and she discontinued medication

## 2025-01-21 NOTE — TELEPHONE ENCOUNTER
Call noted.  If needed if we have a sample we may consider bringing patient in for nurse visit to start Xolair prior to approval by insurance

## 2025-01-21 NOTE — TELEPHONE ENCOUNTER
Patient placed on schedule per Dr. Joshi at 7:15 pm     RN left voicemail for patient that appointment was scheduled for 7:15 pm but per Dr. Joshi will see her earlier in the day  Sent [employer] message as well  Left office hours and call back number if any additional questions/concerns

## 2025-01-21 NOTE — TELEPHONE ENCOUNTER
Dr. Joshi completed Prescriber Service Form.     When printing patient's insurance card, nurse noticed that patient is covered under [facility] [employer] [facility].     Referral Prior authorization for Xolair requested through [facility]. Await [facility] response.     Patient will not qualify for copayment assistance for Xolair under her covered plan as usually this plan has a $0 copayment for Xolair     Will send [employer] completed forms to [facility] for benefit investigation purposes.     Completed [employer] Forms (prescriber service form and patient consent form) faxed to [employer] [facility] at [phone].      Await fax confirmation.

## 2025-01-21 NOTE — TELEPHONE ENCOUNTER
Patient called to follow-up on this message. I did try to contact the RN station and got no answer. She stated she's not having any severe symptoms right now but she would like a phone callback with further directions.

## 2025-01-21 NOTE — TELEPHONE ENCOUNTER
[place] Prescriber Service Form clinical portion completed.      Form placed on Dr. Joshi's desk for review, completion of prescription section and signature.

## 2025-01-21 NOTE — TELEPHONE ENCOUNTER
30 minutes is fine as long as no prior issues with Xolair in the past as patient has been on Xolair within the past year

## 2025-01-21 NOTE — TELEPHONE ENCOUNTER
RN called to patient   Helped schedule injection appointment for Xolair sample on 1/27/25  Routed to Dr. Joshi as ROCIO

## 2025-01-21 NOTE — TELEPHONE ENCOUNTER
Dr. Joshi please advise     Hi, just wanted to let doctor know….very early this morning (Tuesday 1/21/25, 2 AM) I woke up to my upper lip starting to swell. I was already on 4 xyzal (I took the xyzal 7pm Monday night). I do not feel any swelling in my throat right now (I’m writing this at 6am Tuesday). I just took 2 Benedryl. My upper lip is moderately swollen….but feels “active”, like it could get bigger. I am going in to work today, and will be checking my messages and have my phone on. I do not have any prednisone on hand, but I have my epi pen. My last dose of prednisone was 30mg on Sunday morning 8am(1/19/25). I will also call and leave a message later this morning. Thank you.     Patient calling back to office   Per patient last dose of prednisone was on Georges morning 30 mg   Patient felt fine Monday with no symptoms  2 am on Tuesday morning patient woke up with upper lip swelling   Patient has been taking 4 Xyzals at once - took 4 last night at 7 pm  RN advised for patient to please spread out dosing for xyzal - usually one at breakfast, early afternoon, dinner, and bedtime   Patient took 2 benadryl tablets this morning at 6 am  Patient speaking in clear and complete sentences on the phone  Denies any breathing issues or trouble swallowing  Patient does have an active epi pen with her   Dropped off Xolair reapplication form on Saturday   RN advised will ask Dr. Joshi for further advice/recommendations  Dr. Joshi when should patient's next dose of xyzal be due to she took 4 tablets of xyzal last night at 7 pm?   Advised if patient has any worsening symptoms such as breathing issues, trouble swallowing, etc. to administer epi pen and immediately go to the ER  Patient verbalized understanding and denies further questions at this time  Routed to Dr Joshi

## 2025-01-27 ENCOUNTER — PATIENT MESSAGE (OUTPATIENT)
Dept: ALLERGY | Facility: CLINIC | Age: 45
End: 2025-01-27

## 2025-01-27 ENCOUNTER — TELEPHONE (OUTPATIENT)
Dept: ALLERGY | Facility: CLINIC | Age: 45
End: 2025-01-27

## 2025-01-27 ENCOUNTER — OFFICE VISIT (OUTPATIENT)
Dept: ALLERGY | Facility: CLINIC | Age: 45
End: 2025-01-27

## 2025-01-27 ENCOUNTER — NURSE ONLY (OUTPATIENT)
Dept: ALLERGY | Facility: CLINIC | Age: 45
End: 2025-01-27
Payer: COMMERCIAL

## 2025-01-27 VITALS
SYSTOLIC BLOOD PRESSURE: 137 MMHG | OXYGEN SATURATION: 97 % | DIASTOLIC BLOOD PRESSURE: 81 MMHG | HEART RATE: 88 BPM | RESPIRATION RATE: 20 BRPM

## 2025-01-27 DIAGNOSIS — Z23 FLU VACCINE NEED: ICD-10-CM

## 2025-01-27 DIAGNOSIS — L50.1 CHRONIC IDIOPATHIC URTICARIA: Primary | ICD-10-CM

## 2025-01-27 DIAGNOSIS — Z23 NEED FOR COVID-19 VACCINE: ICD-10-CM

## 2025-01-27 DIAGNOSIS — J30.89 SEASONAL AND PERENNIAL ALLERGIC RHINOCONJUNCTIVITIS: ICD-10-CM

## 2025-01-27 DIAGNOSIS — J30.2 SEASONAL AND PERENNIAL ALLERGIC RHINOCONJUNCTIVITIS: ICD-10-CM

## 2025-01-27 DIAGNOSIS — H10.10 SEASONAL AND PERENNIAL ALLERGIC RHINOCONJUNCTIVITIS: ICD-10-CM

## 2025-01-27 DIAGNOSIS — Z51.81 VISIT FOR MONITORING XOLAIR THERAPY: ICD-10-CM

## 2025-01-27 DIAGNOSIS — Z79.899 VISIT FOR MONITORING XOLAIR THERAPY: ICD-10-CM

## 2025-01-27 RX ORDER — PREDNISONE 10 MG/1
TABLET ORAL
Qty: 38 TABLET | Refills: 0 | Status: SHIPPED | OUTPATIENT
Start: 2025-01-27

## 2025-01-27 RX ORDER — OMALIZUMAB 300 MG/2ML
300 INJECTION, SOLUTION SUBCUTANEOUS
Qty: 2 EACH | Refills: 5 | Status: SHIPPED | OUTPATIENT
Start: 2025-01-27

## 2025-01-27 NOTE — TELEPHONE ENCOUNTER
Prior Authorization Approval received through [facility] site [id number].     Medical Policy.     Xolair     Effective Dates: 1/22/2025 - 1/22/2026    PA#: [id number]    Approved Specialty Pharmacy:    [facility].

## 2025-01-27 NOTE — TELEPHONE ENCOUNTER
Vamsi Joshi can I get Xolair in clinic orders for Sejal Gonzales?     Patient gets Xolair 300 mg/ 2 mL sub q for CIU.

## 2025-01-27 NOTE — TELEPHONE ENCOUNTER
Left voicemail for patient to schedule 3 month follow up appointment and next shot appointment for 4 weeks from today.

## 2025-01-27 NOTE — PROGRESS NOTES
Xolair Progress Note:     See \"vitals\" flow sheet for vital sign detail.   See MAR for injection detail.     Per standing order pt to continue Xolair injections every 4 weeks 300 mg.   PT verified name, , and injection to be given. Xolair 300 mg SC given upper left arm.    Sample    NDC- 60031-464-08  Lot- [id number]  Exp- 2025     Patient Status: Patient d/c'd home at 1646, 30 minutes s/p Xolair injections for chronic idiopathic urticaria. Patient  tolerates injection without complications. No further questions or concerns at this time.  Return to clinic for next injection as advised by Dr. Joshi.     Time d/c to home 1646

## 2025-01-27 NOTE — TELEPHONE ENCOUNTER
Patient presented for Xolair injection appointment (sample used).    Patient informed that [facility] approved Xolair prior authorization and covered specialty pharmacy will be [facility].     Patient given telephone number of [phone].  She was informed to call [facility] in about 1 week to register and give permission for Dr. Joshi's office to schedule delivery of Xolair, as Dr. Joshi will need to send prescription to pharmacy for Xolair and Pharmacy will need to verify coverage.     Patient informed that if she has a copayment, she may be able to use copayment assistance for Xolair through [employer]. Patient informed that [employer] is still processing her request. Nurse will call patient with further information once benefit investigation report is received from [employer].     Patient verbalized understanding of information.

## 2025-01-27 NOTE — TELEPHONE ENCOUNTER
Dr. Joshi.  Xolair prior authorization was approved.     Patient requests prescription for Xolair 300 mg/2 mL prefilled syringe.     Prescription pended for routing to [facility].

## 2025-01-27 NOTE — PROGRESS NOTES
Sejal Gonzales is a 44 year old female.    HPI:     Chief Complaint   Patient presents with    Hives     Pt here to discuss hives and re-starting Xolair. Has been using Xyzal QID and Benadryl 50mg every 4 hours. Has had upper lip and eye swelling recently with hives. Denies any difficulties breathing, talking or swallowing.      Patient is a 44-year-old female who presents for follow-up with a chief complaint of hives      Patient with history of chronic idiopathic urticaria previous Xolair which was discontinued in Jan 2024     Patient with recent flare of hives over the past 4+ weeks.  Previous courses of prednisone x 2.  Denies oral swelling or respiratory issues.  Currently using Xyzal up to 4 times per day.  Patient interested in restarting Xolair as a treatment option given her chronic idiopathic urticaria  No current pred at home     Allergic rhinitis  Denies current symptoms including runny nose sneezing itchy watery eyes      Patient presents for sample of Xolair to reinitiate treatment with Xolair as we await approval from her insurance given her persistent symptoms in spite of antihistamines and steroids.    Picture suggest urticaria and angioedema of eyes            HISTORY:  Past Medical History:    Angioedema    IBS (irritable bowel syndrome)    diet    PONV (postoperative nausea and vomiting)    Visual impairment    contacts, eyeglasses      Past Surgical History:   Procedure Laterality Date    Cholecystectomy  2005    Colonoscopy  2/25/2010    Colonoscopy      Thyroid lobectomy,unilat  01/17/2022    Upper gi endoscopy,exam      [place] teeth removed        Family History   Problem Relation Age of Onset    Heart Disorder Father         pacemaker    Colon Polyps Father     Breast Cancer Mother 70    Breast Cancer Maternal Grandmother 70    Cancer Maternal Grandmother 70        bone    Diabetes Maternal Grandmother     Cancer Maternal Grandfather         lung -smoker (cause of death)    Heart Disease  Maternal Grandfather     Heart Disease Paternal Grandfather     Diabetes Paternal Grandfather     Colon Cancer Neg       Social History:   Social History     Socioeconomic History    Marital status:    Tobacco Use    Smoking status: Never     Passive exposure: Never    Smokeless tobacco: Never    Tobacco comments:     No Household Smokers.    Vaping Use    Vaping status: Never Used   Substance and Sexual Activity    Alcohol use: No     Alcohol/week: 0.0 standard drinks of alcohol    Drug use: No    Sexual activity: Yes     Partners: Male   Other Topics Concern    Caffeine Concern Yes     Comment: coffee, 3cups weekly        Medications (Active prior to today's visit):  Current Outpatient Medications   Medication Sig Dispense Refill    predniSONE 10 MG Oral Tab Take 40mg q day x 5 days,then 30mg x 3 days,then 20 mg x 3 days,then10 mg x 3 days with food 38 tablet 0    Ergocalciferol (VITAMIN D OR) Take 2,000 Units by mouth daily.      Multiple Vitamins-Minerals (MULTI-VITAMIN/MINERALS) Oral Tab Take 1 tablet by mouth daily.      hydrOXYzine 25 MG Oral Tab Take 1 tablet (25 mg total) by mouth every 4 (four) hours as needed for Itching. 30 tablet 0    levocetirizine (XYZAL) 5 MG Oral Tab Take 1 tablet (5 mg total) by mouth nightly. 30 tablet 5    EPINEPHrine (EPIPEN 2-BRITTANY) 0.3 MG/0.3ML Injection Solution Auto-injector Inject IM in event of  allergic reaction (Patient not taking: Reported on 1/27/2025) 2 each 0    Na Sulfate-K Sulfate-Mg Sulf (SUPREP BOWEL PREP KIT) 17.5-3.13-1.6 GM/177ML Oral Solution Take as directed (Patient not taking: Reported on 1/27/2025) 1 each 0       Allergies:  Allergies[1]      ROS:   Allergic/Immuno:  See hpi  Cardiovascular:  Negative for irregular heartbeat/palpitations, chest pain, edema  Constitutional:  Negative night sweats,weight loss, irritability and lethargy  ENMT:  Negative for ear drainage, hearing loss and nasal drainage  Eyes:  Negative for eye discharge and vision  loss  Gastrointestinal:  Negative for abdominal pain, diarrhea and vomiting  Integumentary:  Negative for pruritus and rash  Respiratory:  Negative for cough, dyspnea and wheezing    PHYSICAL EXAM:   Constitutional: responsive, no acute distress noted  Head/Face: NC/Atraumatic  Eyes/Vision: conjunctiva and lids are normal extraocular motion is intact   Ears/Audiometry: tympanic membranes are normal bilaterally hearing is grossly intact  Nose/Mouth/Throat: nose and throat are clear mucous membranes are moist   Neck/Thyroid: neck is supple without adenopathy  Lymphatic: no abnormal cervical, supraclavicular or axillary adenopathy is noted  Respiratory: normal to inspection lungs are clear to auscultation bilaterally normal respiratory effort   Cardiovascular: regular rate and rhythm no murmurs, gallups, or rubs  Abdomen: soft non-tender non-distended  Skin/Hair: no unusual rashes present   Extremities: no edema, cyanosis, or clubbing     ASSESSMENT/PLAN:   Assessment   Encounter Diagnoses   Name Primary?    Chronic idiopathic urticaria Yes    Visit for monitoring Xolair therapy     Flu vaccine need     Need for COVID-19 vaccine     Seasonal and perennial allergic rhinoconjunctivitis        #1 chronic idiopathic urticaria with angioedema  Recent flare over the past 4+ weeks.  Has previously been doing well with Xyzal.  Now currently using Xyzal up to 4 times per day Benadryl every 6 hours.  2 courses of prednisone over the past 4 weeks.  Last Xolair was in January 2024 Xolair 300 mg given in office today as a sample followed by 30 minutes of observation.  No prior issues with Xolair in the past.  Continue with Xyzal 4 times per day  Benadryl every 4-6 hours as needed  Course of prednisone to have in case Of worsening symptoms in spite of above recommendations     #2 allergic rhinitis  Denies current symptoms  Xyzal as an antihistamine  Flonase 2 sprays per nostril once a day if having prominent nasal congestion or  postnasal drip.  Reviewed avoidance measures and potential treatment option immunotherapy      #3 flu vaccine recommended    4.  COVID-vaccine booster recommended for 6 months and older.  Most recent boosters in September 2024                    Orders This Visit:  No orders of the defined types were placed in this encounter.      Meds This Visit:  Requested Prescriptions     Signed Prescriptions Disp Refills    predniSONE 10 MG Oral Tab 38 tablet 0     Sig: Take 40mg q day x 5 days,then 30mg x 3 days,then 20 mg x 3 days,then10 mg x 3 days with food       Imaging & Referrals:  None     1/27/2025  Jose Carlos Joshi MD    If medication samples were provided today, they were provided solely for patient education and training related to self administration of these medications.  Teaching, instruction and sample was provided to the patient by myself.  Teaching included  a review of potential adverse side effects as well as potential efficacy.  Patient's questions were answered in regards to medication administration and dosing and potential side effects. Teaching was provided via the teach back method           [1]   Allergies  Allergen Reactions    Mucinex Dm Maximum Strength [Dextromethorphan-Guaifenesin] HIVES and SWELLING    Adhesive Tape RASH     bandaids cause rash, paper tape ok

## 2025-01-28 NOTE — TELEPHONE ENCOUNTER
Patient returned call and Xolair injection appointments and Follow-Up Appointment with Dr. Joshi scheduled.

## 2025-01-30 NOTE — TELEPHONE ENCOUNTER
Patient returned call 1/28/2025 and scheduled Xolair injection and Follow-up appointment with Dr. Joshi.

## 2025-01-31 NOTE — TELEPHONE ENCOUNTER
[facility] contacted.  RN spoke with , Contreras.      Xolair prior authorization reported as below.      Referral  Referral # [id number]  Referral Notes  Number of Notes: 3  .  Type Date User Summary Attachment   Prior Authorization Confirmed/Denied/NA 01/24/2025 11:06 AM [facility] -   Note:    .

## 2025-01-31 NOTE — TELEPHONE ENCOUNTER
See [employer] message sent to patient.     You  Sejal SOTELO Kathie now (1:47 PM)     MAURA Post,     As we discussed at your last Allergy Office Visit, the prior authorization for Xolair was approved.      I did receive a benefit investigation report from [employer]  [employer] that you will have a $0 copayment for Xolair and Copayment Assistance is not necessary at this time.      Dr. Joshi sent a prescription for Xolair to your covered specialty pharmacy, [facility], telephone number [phone].      [facility] is in the process of verifying the Xolair prior authorization.      Early next week, please call [facility] to register if needed and nataly permission for the Allergy Office to schedule delivery of the Xolair to the physician office.         Regards,     Ness MOLINA RN

## 2025-02-04 NOTE — TELEPHONE ENCOUNTER
[facility] contacted at [phone].  RN spoke with pharmacy , Ale.     RN attempted to schedule delivery of Xolair.     Representative reports that patient's Prior Authorization is in process of Major Medical Verification.

## 2025-02-04 NOTE — TELEPHONE ENCOUNTER
You  Sejal Vázquez now (5:51 PM)     MAURA Post,      I contacted [facility] this morning to attempt to schedule delivery of your Xolair to the physician's office.      They stated that they are in the process of verifying coverage of Xolair.       I faxed a copy of the [employer] Xolair Letter to [facility] after speaking with the pharmacy to speed up the process.     Hopefully by the Thursday I will be able to schedule delivery of Xolair.          Regards,     Ness MOLINA, RN             This [employer] message has not been read.     Sejal SALINAS Em Allergy Clinical Staff (supporting You)1 hour ago (4:41 PM)       Thx so much Ness. I called the number you gave & the woman said that I could not give my consent at this time because they still need to reach my insurance & confirm how much the copay will be. I told her the info you sent in above message about $0 copay & she said she didn’t know how you got that info because they hadn’t heard back yet about copay. I guess there must be some gap in information or something. She told me to wait and they will contact me when it’s time to give my consent.  I wanted to pass this info along, in case something else had to be done or maybe this woman was incorrect.Let me know if there’s anything else I can do besides wait. Thank you for all you do Ness! :)

## 2025-02-04 NOTE — TELEPHONE ENCOUNTER
Fax received from [facility] asking for faxed copy of most recent Xolair prior authorization approval letter.     Form asked that letter be faxed to [phone].     Xolair Prior Authorization Referral/Prior Authorization Letter faxed to [facility] at fax number above.      Fax confirmation received.

## 2025-02-10 ENCOUNTER — TELEPHONE (OUTPATIENT)
Dept: ALLERGY | Facility: CLINIC | Age: 45
End: 2025-02-10

## 2025-02-10 NOTE — TELEPHONE ENCOUNTER
[facility] 's call transferred from phone room.     Representative clarified asked for confirmation of Xolair Prior Authorization/Referral information as below that was sent to [facility] via fax.  Fax was unclear.        Sejal Gonzales  1/15/2025   Telephone  MRN: [id number]  Description: 44 year old female Provider: Jose Carlos Joshi MD Department: [facility]     Telephone  Open  1/15/2025  [facility] [place]       Jose Carlos Joshi MD  ALLERGY Chronic idiopathic urticaria  Dx Condition Update  Acute  Reason for call     Reason for Call    Condition Update    Acute      All Conversations: Condition Update  (Newest Message First)February 5, 2025  Sejal finley P Em Allergy Clinical Staff (supporting You)         2/5/25  7:45 AM  Thank you so much!    Messages that only contain a simple \"Thank you\" are hidden in In Basket to save you clicks.  February 4, 2025  [facility]    2/4/25  5:52 PM  Note     You  Sejal Vázquez now (5:51 PM)        Michelle Post,      I contacted [facility] this morning to attempt to schedule delivery of your Xolair to the physician's office.      They stated that they are in the process of verifying coverage of Xolair.       I faxed a copy of the [employer] Xolair Letter to [facility] after speaking with the pharmacy to speed up the process.     Hopefully by the Thursday I will be able to schedule delivery of Xolair.          Regards,     Ness MOLINA RN             This [employer] message has not been read.      Sejal SALINAS Em Allergy Clinical Staff (supporting You)1 hour ago (4:41 PM)         Thx so much Ness. I called the number you gave & the woman said that I could not give my consent at this time because they still need to reach my insurance & confirm how much the copay will be. I told her the info you sent in above message about $0  copay & she said she didn’t know how you got that info because they hadn’t heard back yet about copay. I guess there must be some gap in information or something. She told me to wait and they will contact me when it’s time to give my consent.  I wanted to pass this info along, in case something else had to be done or maybe this woman was incorrect.Let me know if there’s anything else I can do besides wait. Thank you for all you do Ness! :)           Me to Sejal Gonzales         2/4/25  5:51 PM  Michelle Post,      I contacted [facility] this morning to attempt to schedule delivery of your Xolair to the physician's office.      They stated that they are in the process of verifying coverage of Xolair.       I faxed a copy of the [employer] Xolair Letter to [facility] after speaking with the pharmacy to speed up the process.     Hopefully by the Thursday I will be able to schedule delivery of Xolair.          Regards,     Ness MOLINA, RN             Last read by Sejal Gonzales at  7:44 AM on 2/5/2025.  Sejal finley P Em Allergy Clinical Staff (supporting You)         2/4/25  4:41 PM  Thx so much Ness. I called the number you gave & the woman said that I could not give my consent at this time because they still need to reach my insurance & confirm how much the copay will be. I told her the info you sent in above message about $0 copay & she said she didn’t know how you got that info because they hadn’t heard back yet about copay. I guess there must be some gap in information or something. She told me to wait and they will contact me when it’s time to give my consent.  I wanted to pass this info along, in case something else had to be done or maybe this woman was incorrect.Let me know if there’s anything else I can do besides wait. Thank you for all you do Ness! :)   Me       2/4/25  9:45 AM  Note     Fax received from [facility] asking for faxed  copy of most recent Xolair prior authorization approval letter.      Form asked that letter be faxed to [phone].      Xolair Prior Authorization Referral/Prior Authorization Letter faxed to [facility] at fax number above.       Fax confirmation received.         [facility]    2/4/25  8:50 AM  Note     [facility] contacted at [phone].  RN spoke with pharmacy , Ale.      RN attempted to schedule delivery of Xolair.      Representative reports that patient's Prior Authorization is in process of Major Medical Verification.         January 31, 2025  [facility]    1/31/25  1:48 PM  Note     See Sendmebox message sent to patient.      You  Sejal Vázquez now (1:47 PM)        Michelle Post,     As we discussed at your last Allergy Office Visit, the prior authorization for Xolair was approved.      I did receive a benefit investigation report from [employer].  [employer] that you will have a $0 copayment for Xolair and Copayment Assistance is not necessary at this time.      Dr. Joshi sent a prescription for Xolair to your covered specialty pharmacy, [facility], telephone number [phone].      [facility] is in the process of verifying the Xolair prior authorization.      Early next week, please call [facility] to register if needed and nataly permission for the Allergy Office to schedule delivery of the Xolair to the physician office.         Regards,     Ness MOLINA RN        Me to Sejal Gonzales         1/31/25  1:47 PM  Michelle Post,     As we discussed at your last Allergy Office Visit, the prior authorization for Xolair was approved.      I did receive a benefit investigation report from [employer].  [employer] that you will have a $0 copayment for Xolair and Copayment Assistance is not necessary at this time.      Dr. Joshi sent a  prescription for Xolair to your covered specialty pharmacy, [facility], telephone number [phone].      [facility] is in the process of verifying the Xolair prior authorization.      Early next week, please call [facility] to register if needed and nataly permission for the Allergy Office to schedule delivery of the Xolair to the physician office.         Regards,     Ness MOLINA RN    Last read by Sejal Gonzales at  7:44 AM on 2/5/2025.  [facility]    1/31/25  1:37 PM  Note     [facility] contacted.  RN spoke with Ben.       Xolair prior authorization reported as below.        Referral  Referral # [id number]  Referral Notes  Number of Notes: 3  .  Type Date User Summary Attachment   Prior Authorization Confirmed/Denied/NA 01/24/2025 11:06 AM Vick Silverio [facility] AUTHORIZ[facility] -   Note:    .

## 2025-02-11 NOTE — TELEPHONE ENCOUNTER
[facility] contacted at [phone]. RN spoke with pharmacy , Amairani.     RN attempted to schedule delivery of Xolair to physician's office.     Representative offers that a paid claim was received from insurance 2/11/2025.     Rep reports that patient's permission for Allergy Office to schedule delivery is needed.     See [employer] message sent to patient.     Vamsi Post,      I spoke with [facility] today, 2/11/2025.       I attempted to schedule delivery of Xolair to the Allergy Office; however, [facility] reports that they need your permission for our office to schedule delivery.      At you earliest convenience, please call [facility] at [phone] and give permission for the Xolair to be scheduled by our office.     Regards,     Ness MOLINA RN

## 2025-02-12 PROBLEM — K64.8 INTERNAL HEMORRHOIDS: Status: ACTIVE | Noted: 2025-02-12

## 2025-02-12 NOTE — TELEPHONE ENCOUNTER
Called [facility]  to coordinate delivery of Xolair.    Spoke to Yamile    Next Injection Appointment Date: 2/25    She reports that they do not have consent from patient but will reach out.  Once pt has provided consent to ship they will reach out to us for coordination.

## 2025-02-13 NOTE — TELEPHONE ENCOUNTER
Message left on patient's voicemail asking that she please call [facility] at [phone] in order to nataly permission for the Allergy Office to schedule delivery of Xolair.     Patient asked to call the Allergy Office back with any questions or concerns.

## 2025-02-14 NOTE — TELEPHONE ENCOUNTER
Called [facility] [phone] to schedule Xolair delivery to office- spoke with Indira     omalizumab (XOLAIR) 300 mg/2mL Subcutaneous Solution Prefilled Syringe scheduled for delivery Tuesday 2/18/25    Next injection appointment is: 2/25 at 4:30pm

## 2025-02-17 ENCOUNTER — TELEPHONE (OUTPATIENT)
Dept: ALLERGY | Facility: CLINIC | Age: 45
End: 2025-02-17

## 2025-02-17 NOTE — TELEPHONE ENCOUNTER
Pharmacy called for status on prior authorization on Rx Xolair. Pharmacy said they received fax from our office on 1/31/25. Please advise          Please call [phone]         Current Outpatient Medications   Medication Sig Dispense Refill           omalizumab (XOLAIR) 300 mg/2mL Subcutaneous Solution Prefilled Syringe Inject 300 mg into the skin every 28 days. ICD 10 Code: L50.1 2 each 5

## 2025-02-18 ENCOUNTER — MED REC SCAN ONLY (OUTPATIENT)
Facility: CLINIC | Age: 45
End: 2025-02-18

## 2025-02-18 NOTE — TELEPHONE ENCOUNTER
See 1/15/2025 Allergy Telephone Encounter.  2/18/2025 note from that encounter.     [facility] has current prior authorization for Xolair.

## 2025-02-18 NOTE — TELEPHONE ENCOUNTER
[facility] contacted at [phone].  RN spoke with pharmacy , Emma.     Rep offers that Xolair was not delivered to physician's office today, 2/18, as scheduled because Xolair 300 mg/2 mL prefilled syringes were out of stock and needed to be ordered.     RN to attempt to schedule delivery to Xolair to physician's office on 2/20/2025.

## 2025-02-21 NOTE — TELEPHONE ENCOUNTER
Xolair 150 mg/mL prefilled syringe x1 received and placed in medication refrigerator. Xolair was ordered by patient.
What Is The Reason For Today's Visit?: History of Melanoma
Year Excised?: 2007

## 2025-02-21 NOTE — TELEPHONE ENCOUNTER
[facility] contacted at [phone].     RN spoke with specialty pharmacy , Yamile.      Xolair 300 mg/2 mL prefilled syringe scheduled for delivery to physician's office: 2/25/2025    Patient's next Xolair injection scheduled for 2/25/2025.

## 2025-02-25 ENCOUNTER — TELEPHONE (OUTPATIENT)
Dept: ALLERGY | Facility: CLINIC | Age: 45
End: 2025-02-25

## 2025-02-25 ENCOUNTER — NURSE ONLY (OUTPATIENT)
Dept: ALLERGY | Facility: CLINIC | Age: 45
End: 2025-02-25
Payer: COMMERCIAL

## 2025-02-25 VITALS
SYSTOLIC BLOOD PRESSURE: 131 MMHG | DIASTOLIC BLOOD PRESSURE: 79 MMHG | RESPIRATION RATE: 18 BRPM | OXYGEN SATURATION: 97 % | HEART RATE: 65 BPM

## 2025-02-25 DIAGNOSIS — L50.1 CHRONIC IDIOPATHIC URTICARIA: Primary | ICD-10-CM

## 2025-02-25 PROCEDURE — 3078F DIAST BP <80 MM HG: CPT | Performed by: ALLERGY & IMMUNOLOGY

## 2025-02-25 PROCEDURE — 96372 THER/PROPH/DIAG INJ SC/IM: CPT | Performed by: ALLERGY & IMMUNOLOGY

## 2025-02-25 PROCEDURE — 3075F SYST BP GE 130 - 139MM HG: CPT | Performed by: ALLERGY & IMMUNOLOGY

## 2025-02-25 NOTE — PROGRESS NOTES
Xolair Progress Note:     Pre-treatment Peak Flow: NA  Post treatment Peak Flow: NA  See \"vitals\" flow sheet for vital sign detail.   See MAR for injection detail.     Per standing order pt to continue Xolair injections every 4  weeks 300 mg.   PT verified name, , and injection to be given. Xolair 150 mg SC given upper right and left arms at 1620.    Xolair Exp:   Xolair Lot #: [id number]    Patient Status: Patient d/c'd home 30 minutes s/p Xolair injections for idiopathic urticaria. Patient  tolerates injection without complications. Site WNL. No further questions or concerns at this time.  Return to clinic for next injection as advised by Dr. Joshi.     Time d/c to home 1650  Next appointment scheduled for: 3/24/2025

## 2025-02-25 NOTE — TELEPHONE ENCOUNTER
Patient in office for monthly Xolair injection.    Dr. Joshi when you have a chance would you mind entering a new order in the MAR for patient's Xolair?    Pt received (1) 300mg PF syringe.   Detail Level: Zone

## 2025-03-14 ENCOUNTER — TELEPHONE (OUTPATIENT)
Dept: ALLERGY | Facility: CLINIC | Age: 45
End: 2025-03-14

## 2025-03-14 NOTE — TELEPHONE ENCOUNTER
[facility] contacted at [phone].     RN spoke with pharmacy .    Representative offers that patient's Xolair prescription is in process.  RN to call back next week to schedule delivery of Xolair to physician's office.

## 2025-03-21 NOTE — TELEPHONE ENCOUNTER
Contacted [facility] [phone] and spoke to Niurka.    Scheduled delivery for patients Xolair 300mg/2mL (prefilled syringe x1) every 28 days    Confirmed patient would not have copay for Xolair    Expected delivery is Tuesday 3/25/25    Patient's next injection appointment is scheduled for Monday 3/24/25- will need to use sample

## 2025-03-24 ENCOUNTER — NURSE ONLY (OUTPATIENT)
Dept: ALLERGY | Facility: CLINIC | Age: 45
End: 2025-03-24
Payer: COMMERCIAL

## 2025-03-24 VITALS — HEART RATE: 83 BPM | DIASTOLIC BLOOD PRESSURE: 73 MMHG | OXYGEN SATURATION: 98 % | SYSTOLIC BLOOD PRESSURE: 137 MMHG

## 2025-03-24 DIAGNOSIS — L50.1 CHRONIC IDIOPATHIC URTICARIA: Primary | ICD-10-CM

## 2025-03-24 PROCEDURE — 3078F DIAST BP <80 MM HG: CPT | Performed by: ALLERGY & IMMUNOLOGY

## 2025-03-24 PROCEDURE — 96372 THER/PROPH/DIAG INJ SC/IM: CPT | Performed by: ALLERGY & IMMUNOLOGY

## 2025-03-24 PROCEDURE — 3075F SYST BP GE 130 - 139MM HG: CPT | Performed by: ALLERGY & IMMUNOLOGY

## 2025-03-25 NOTE — PROGRESS NOTES
Per standing order patient to continue Xolair injections every 28 days- 300 mg.     Patient verified name, , and injection to be given.     Patient was given a sample. Denies education    At 1715, Xolair 150 mg administered subcutaneously to left upper arm and 150 mg administered to right upper arm.       Xolair 150 mg/mL prefilled syringe x 2  Lot #: [id number]  Exp Date: MAY 2025      Patient tolerated injection with no adverse side effects noted at time of administration.         ?  Patient monitored x 30 min after administration.   At 1745, patient released from office without any sign/symptoms of local or systemic reaction.  ?  Patient scheduled next Biologic Injection Appointment for 25

## 2025-04-22 ENCOUNTER — NURSE ONLY (OUTPATIENT)
Dept: ALLERGY | Facility: CLINIC | Age: 45
End: 2025-04-22
Payer: COMMERCIAL

## 2025-04-22 VITALS — OXYGEN SATURATION: 96 % | DIASTOLIC BLOOD PRESSURE: 86 MMHG | HEART RATE: 76 BPM | SYSTOLIC BLOOD PRESSURE: 141 MMHG

## 2025-04-22 DIAGNOSIS — L50.1 CHRONIC IDIOPATHIC URTICARIA: Primary | ICD-10-CM

## 2025-04-22 PROCEDURE — 3077F SYST BP >= 140 MM HG: CPT | Performed by: ALLERGY & IMMUNOLOGY

## 2025-04-22 PROCEDURE — 96372 THER/PROPH/DIAG INJ SC/IM: CPT | Performed by: ALLERGY & IMMUNOLOGY

## 2025-04-22 PROCEDURE — 3079F DIAST BP 80-89 MM HG: CPT | Performed by: ALLERGY & IMMUNOLOGY

## 2025-04-22 NOTE — PROGRESS NOTES
Xolair Progress Note:     See \"vitals\" flow sheet for vital sign detail.   See MAR for injection detail.     Per standing order pt to continue Xolair injections every 4  weeks 300 mg.   PT verified name, , and injection to be given. Xolair 150 mg SC given upper left arms at 1600.    Xolair Exp: 2025  Xolair Lot #[id number]    Patient Status: Patient d/c'd home 30 minutes s/p Xolair injections for idiopathic urticaria. Patient  tolerates injection without complications. Site WNL. No further questions or concerns at this time.  Return to clinic for next injection as advised by Dr. Joshi.     Time d/c to home 1630  Next appointment scheduled for: 2025

## 2025-04-24 ENCOUNTER — TELEPHONE (OUTPATIENT)
Dept: ALLERGY | Facility: CLINIC | Age: 45
End: 2025-04-24

## 2025-04-24 NOTE — TELEPHONE ENCOUNTER
[facility] contacted at [phone].  RN spoke with pharmacy , Terence.       Xolair 300 mg/ 2 mL prefilled syringe scheduled for delivery to physician's office on 4/29/2025.     Patient's next scheduled Xolair injection visit: 5/20/2025.

## 2025-05-09 ENCOUNTER — PATIENT MESSAGE (OUTPATIENT)
Dept: INTERNAL MEDICINE CLINIC | Facility: CLINIC | Age: 45
End: 2025-05-09

## 2025-05-09 DIAGNOSIS — Z12.31 ENCOUNTER FOR MAMMOGRAM TO ESTABLISH BASELINE MAMMOGRAM: Primary | ICD-10-CM

## 2025-05-09 NOTE — TELEPHONE ENCOUNTER
Patient's last mammogram 06/01/24 was normal.  Last physical was 07/15/24.  Mammogram order generated as per protocol.

## 2025-05-12 ENCOUNTER — TELEPHONE (OUTPATIENT)
Dept: ENDOCRINOLOGY CLINIC | Facility: CLINIC | Age: 45
End: 2025-05-12

## 2025-05-12 DIAGNOSIS — E03.8 SUBCLINICAL HYPOTHYROIDISM: Primary | ICD-10-CM

## 2025-05-12 NOTE — TELEPHONE ENCOUNTER
Patient  is requesting ultrasound orders and lab orders to be extended to the end of the year.  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  Please call

## 2025-05-20 ENCOUNTER — OFFICE VISIT (OUTPATIENT)
Dept: ALLERGY | Facility: CLINIC | Age: 45
End: 2025-05-20
Payer: COMMERCIAL

## 2025-05-20 ENCOUNTER — NURSE ONLY (OUTPATIENT)
Dept: ALLERGY | Facility: CLINIC | Age: 45
End: 2025-05-20

## 2025-05-20 VITALS
TEMPERATURE: 98 F | OXYGEN SATURATION: 99 % | RESPIRATION RATE: 20 BRPM | DIASTOLIC BLOOD PRESSURE: 82 MMHG | SYSTOLIC BLOOD PRESSURE: 125 MMHG | HEART RATE: 74 BPM

## 2025-05-20 DIAGNOSIS — J30.89 SEASONAL AND PERENNIAL ALLERGIC RHINOCONJUNCTIVITIS: ICD-10-CM

## 2025-05-20 DIAGNOSIS — H10.10 SEASONAL AND PERENNIAL ALLERGIC RHINOCONJUNCTIVITIS: ICD-10-CM

## 2025-05-20 DIAGNOSIS — L50.1 CHRONIC IDIOPATHIC URTICARIA: Primary | ICD-10-CM

## 2025-05-20 DIAGNOSIS — J30.2 SEASONAL AND PERENNIAL ALLERGIC RHINOCONJUNCTIVITIS: ICD-10-CM

## 2025-05-20 DIAGNOSIS — Z79.620 VISIT FOR MONITORING XOLAIR THERAPY: ICD-10-CM

## 2025-05-20 DIAGNOSIS — Z51.81 VISIT FOR MONITORING XOLAIR THERAPY: ICD-10-CM

## 2025-05-20 PROCEDURE — 96372 THER/PROPH/DIAG INJ SC/IM: CPT | Performed by: ALLERGY & IMMUNOLOGY

## 2025-05-20 PROCEDURE — 3074F SYST BP LT 130 MM HG: CPT | Performed by: ALLERGY & IMMUNOLOGY

## 2025-05-20 PROCEDURE — 99214 OFFICE O/P EST MOD 30 MIN: CPT | Performed by: ALLERGY & IMMUNOLOGY

## 2025-05-20 PROCEDURE — 3079F DIAST BP 80-89 MM HG: CPT | Performed by: ALLERGY & IMMUNOLOGY

## 2025-05-20 NOTE — PROGRESS NOTES
Per standing order patient to continue Xolair injections every 28 days- 300 mg.     Patient verified name, date-of-birth, and injection to be given.     Xolair 300 mg/2 mL prefilled syringe  NDC#: 42135-727-42  Lot #: [id number]  Exp Date: 1/2026    At 1635, Xolair 300 mg/2 mL prefilled syringe administered subcutaneously to Left Upper Arm.     Patient tolerated injection with no adverse side effects noted at time of administration.         ?  Patient monitored x 30 min after administration.   At 1705, patient released from office without any sign/symptoms of local or systemic reaction.    Pre PF: (3 readings) for asthma diagnosis  Post PF: (3 readings)  ?  Patient scheduled next Biologic Injection Appointment for 6/17/2025 at 11:10.

## 2025-05-20 NOTE — PROGRESS NOTES
The following individual(s) verbally consented to be recorded using ambient AI listening technology and understand that they can each withdraw their consent to this listening technology at any point by asking the clinician to turn off or pause the recording:    Patient name: Sejal SOTELO Janet

## 2025-05-20 NOTE — PROGRESS NOTES
Sejal Gonzales is a 45 year old female.    HPI:     Chief Complaint   Patient presents with    Hives     Patient presents for 4 month follow-up and Xolair injection.  She offers that hives have been well controlled.      Patient is a 45-year-old female who presents for follow-up with a chief complaint of chronic hives and allergies  Patient last seen by me in January 27, 2025  History of chronic idiopathic urticaria angioedema and allergic rhinitis  Medications list include Xolair 300 mg every 4 weeks Xyzal Atarax EpiPen      Immunizations reviewed    Today patient reports  History of Present Illness  Sejal Gonzales is a 45 year old female with chronic hives who presents for follow-up and management of her condition.    She has not experienced significant hives since starting Xolair approximately four to five months ago. Prior to this treatment, she had frequent episodes of hives. Since starting Xolair, she has only experienced minor symptoms, such as a red, itchy spot on her face that resolved within twenty minutes. She is not currently experiencing any swelling or significant hive outbreaks.    She has not been taking Xyzal regularly, initially taking it every other day but has not used it for the past two months as she felt it was unnecessary. She confirms no recent emergency room visits or need for prednisone or EpiPen use. She has been receiving Xolair injections in the office without any side effects.    Her past medical history includes thyroid nodules, for which she sees an endocrinologist annually. She had a partial thyroidectomy a few years ago but is not currently on Synthroid. She has a history of allergies to Mucinex and adhesive tape. Her current medications include a multivitamin and Vitamin D, and she has an EpiPen available if needed.    She lives with two dogs at home. No current pain, no recent hives or swelling, no emergency room visits, and no recent use of prednisone or EpiPen.          HISTORY:  Past Medical History[1]   Past Surgical History[2]   Family History[3]   Social History: Short Social Hx on File[4]     Medications (Active prior to today's visit):  Current Medications[5]    Allergies:  Allergies[6]      ROS:   Allergic/Immuno:  See hpi  Cardiovascular:  Negative for irregular heartbeat/palpitations, chest pain, edema  Constitutional:  Negative night sweats,weight loss, irritability and lethargy  ENMT:  Negative for ear drainage, hearing loss and nasal drainage  Eyes:  Negative for eye discharge and vision loss  Gastrointestinal:  Negative for abdominal pain, diarrhea and vomiting  Integumentary:  Negative for pruritus and rash  Respiratory:  Negative for cough, dyspnea and wheezing    PHYSICAL EXAM:   Constitutional: responsive, no acute distress noted  Head/Face: NC/Atraumatic  Eyes/Vision: conjunctiva and lids are normal extraocular motion is intact   Ears/Audiometry: tympanic membranes are normal bilaterally hearing is grossly intact  Nose/Mouth/Throat: nose and throat are clear mucous membranes are moist   Neck/Thyroid: neck is supple without adenopathy  Lymphatic: no abnormal cervical, supraclavicular or axillary adenopathy is noted  Respiratory: normal to inspection lungs are clear to auscultation bilaterally normal respiratory effort   Cardiovascular: regular rate and rhythm no murmurs, gallups, or rubs  Abdomen: soft non-tender non-distended  Skin/Hair: no unusual rashes present   Extremities: no edema, cyanosis, or clubbing     ASSESSMENT/PLAN:   Assessment   Encounter Diagnoses   Name Primary?    Chronic idiopathic urticaria [L50.1] Yes    Visit for monitoring Xolair therapy     Seasonal and perennial allergic rhinoconjunctivitis        Assessment & Plan     Assessment & Plan  Chronic spontaneous urticaria  Chronic spontaneous urticaria is well-controlled with Xolair. No recent episodes of hives or angioedema since starting Xolair. Occasional erythema and pruritus occur  but are not significant. No emergency interventions or use of prednisone or epinephrine auto-injector. Xyzal is not taken regularly but can be used for intermittent flares. No adverse effects from Xolair. Discussed potential insurance requirement for home administration of Xolair for cost-saving.  - Administer Xolair in the office today.  - Advise daily Xyzal 5 mg once a day up to 4 times per day if needed.  - Use Xyzal as needed for intermittent flares.  - Monitor response to Xolair over the next three to six months.  - Consider extending the interval between Xolair doses if well-controlled after six months.  - Ensure availability of prednisone at home for emergencies.    Allergy to Mucinex and adhesive tape  Allergy to Mucinex and adhesive tape with no current issues.    Thyroid nodule  Thyroid nodules present on the remaining thyroid lobe. Regular follow-up with endocrinology. No current thyroid medication. Aware of potential link between thyroid dysfunction and urticaria.  - Continue annual thyroid evaluations with endocrinology.    Allergic rhinitis  Stable at this time.  Xyzal once a day.  May add Flonase or Nasacort 2 sprays per nostril once a day if refractory    Flu vaccine in the fall recommended  COVID-vaccine booster in the fall recommended       Orders This Visit:  No orders of the defined types were placed in this encounter.      Meds This Visit:  Requested Prescriptions      No prescriptions requested or ordered in this encounter       Imaging & Referrals:  None     5/20/2025  Jose Carlos Joshi MD    If medication samples were provided today, they were provided solely for patient education and training related to self administration of these medications.  Teaching, instruction and sample was provided to the patient by myself.  Teaching included  a review of potential adverse side effects as well as potential efficacy.  Patient's questions were answered in regards to medication administration and  dosing and potential side effects. Teaching was provided via the teach back method           [1]   Past Medical History:   Angioedema    IBS (irritable bowel syndrome)    diet    PONV (postoperative nausea and vomiting)    Visual impairment    contacts, eyeglasses   [2]   Past Surgical History:  Procedure Laterality Date    Cholecystectomy  2005    Colonoscopy  2/25/2010    Colonoscopy      Colonoscopy N/A 2/12/2025    Procedure: COLONOSCOPY;  Surgeon: Tyrese Adames MD;  Location: [facility] MAIN OR    Thyroid lobectomy,unilat  01/17/2022    Upper gi endoscopy,exam      [place] teeth removed     [3]   Family History  Problem Relation Age of Onset    Heart Disorder Father         pacemaker    Colon Polyps Father     Breast Cancer Mother 70    Breast Cancer Maternal Grandmother 70    Cancer Maternal Grandmother 70        bone    Diabetes Maternal Grandmother     Cancer Maternal Grandfather         lung -smoker (cause of death)    Heart Disease Maternal Grandfather     Heart Disease Paternal Grandfather     Diabetes Paternal Grandfather     Colon Cancer Neg    [4]   Social History  Socioeconomic History    Marital status:    Tobacco Use    Smoking status: Never     Passive exposure: Never    Smokeless tobacco: Never    Tobacco comments:     No Household Smokers.    Vaping Use    Vaping status: Never Used   Substance and Sexual Activity    Alcohol use: No     Alcohol/week: 0.0 standard drinks of alcohol    Drug use: No    Sexual activity: Yes     Partners: Male   Other Topics Concern    Caffeine Concern Yes     Comment: coffee, 3cups weekly   [5]   Current Outpatient Medications   Medication Sig Dispense Refill    omalizumab (XOLAIR) 300 mg/2mL Subcutaneous Solution Prefilled Syringe Inject 300 mg into the skin every 28 days. ICD 10 Code: L50.1 2 each 5    Ergocalciferol (VITAMIN D OR) Take 2,000 Units by mouth daily.      Multiple Vitamins-Minerals (MULTI-VITAMIN/MINERALS) Oral Tab Take 1 tablet by mouth  daily.      levocetirizine (XYZAL) 5 MG Oral Tab Take 1 tablet (5 mg total) by mouth nightly. 30 tablet 5    predniSONE 10 MG Oral Tab Take 40mg q day x 5 days,then 30mg x 3 days,then 20 mg x 3 days,then10 mg x 3 days with food (Patient not taking: Reported on 5/20/2025) 38 tablet 0    EPINEPHrine (EPIPEN 2-BRITTANY) 0.3 MG/0.3ML Injection Solution Auto-injector Inject IM in event of  allergic reaction (Patient not taking: Reported on 5/20/2025) 2 each 0    Na Sulfate-K Sulfate-Mg Sulf (SUPREP BOWEL PREP KIT) 17.5-3.13-1.6 GM/177ML Oral Solution Take as directed (Patient not taking: Reported on 3/24/2025) 1 each 0    hydrOXYzine 25 MG Oral Tab Take 1 tablet (25 mg total) by mouth every 4 (four) hours as needed for Itching. (Patient not taking: Reported on 5/20/2025) 30 tablet 0   [6]   Allergies  Allergen Reactions    Mucinex Dm Maximum Strength [Dextromethorphan-Guaifenesin] HIVES and SWELLING    Adhesive Tape RASH     bandaids cause rash, paper tape ok

## 2025-05-20 NOTE — PATIENT INSTRUCTIONS
Assessment & Plan  Chronic spontaneous urticaria  Chronic spontaneous urticaria is well-controlled with Xolair. No recent episodes of hives or angioedema since starting Xolair. Occasional erythema and pruritus occur but are not significant. No emergency interventions or use of prednisone or epinephrine auto-injector. Xyzal is not taken regularly but can be used for intermittent flares. No adverse effects from Xolair. Discussed potential insurance requirement for home administration of Xolair for cost-saving.  - Administer Xolair in the office today.  - Advise daily Xyzal 5 mg once a day up to 4 times per day if needed.  - Use Xyzal as needed for intermittent flares.  - Monitor response to Xolair over the next three to six months.  - Consider extending the interval between Xolair doses if well-controlled after six months.  - Ensure availability of prednisone at home for emergencies.    Allergy to Mucinex and adhesive tape  Allergy to Mucinex and adhesive tape with no current issues.    Thyroid nodule  Thyroid nodules present on the remaining thyroid lobe. Regular follow-up with endocrinology. No current thyroid medication. Aware of potential link between thyroid dysfunction and urticaria.  - Continue annual thyroid evaluations with endocrinology.    Flu vaccine in the fall recommended  COVID-vaccine booster in the fall recommended

## 2025-05-22 ENCOUNTER — TELEPHONE (OUTPATIENT)
Dept: ALLERGY | Facility: CLINIC | Age: 45
End: 2025-05-22

## 2025-05-22 NOTE — TELEPHONE ENCOUNTER
RN called [facility] to coordinate delivery of Xolair  Spoke with rep named Andreea   Next injection appointment is 6/17.  Delivery scheduled for: 5/29    RN confirmed shipping address.

## 2025-05-22 NOTE — TELEPHONE ENCOUNTER
A rep from [facility] called requesting to schedule the delivery for the patients Xolair.

## 2025-05-27 ENCOUNTER — PATIENT MESSAGE (OUTPATIENT)
Dept: INTERNAL MEDICINE CLINIC | Facility: CLINIC | Age: 45
End: 2025-05-27

## 2025-05-27 DIAGNOSIS — Z00.00 PHYSICAL EXAM, ANNUAL: Primary | ICD-10-CM

## 2025-06-01 ENCOUNTER — LAB ENCOUNTER (OUTPATIENT)
Dept: LAB | Facility: HOSPITAL | Age: 45
End: 2025-06-01
Attending: INTERNAL MEDICINE
Payer: COMMERCIAL

## 2025-06-01 DIAGNOSIS — Z00.00 PHYSICAL EXAM, ANNUAL: ICD-10-CM

## 2025-06-01 LAB
ALBUMIN SERPL-MCNC: 4.4 G/DL (ref 3.2–4.8)
ALBUMIN/GLOB SERPL: 1.6 {RATIO} (ref 1–2)
ALP LIVER SERPL-CCNC: 56 U/L (ref 39–100)
ALT SERPL-CCNC: 10 U/L (ref 10–49)
ANION GAP SERPL CALC-SCNC: 8 MMOL/L (ref 0–18)
AST SERPL-CCNC: 14 U/L (ref ?–34)
BILIRUB SERPL-MCNC: 1.1 MG/DL (ref 0.3–1.2)
BUN BLD-MCNC: 10 MG/DL (ref 9–23)
BUN/CREAT SERPL: 14.1 (ref 10–20)
CALCIUM BLD-MCNC: 9.1 MG/DL (ref 8.7–10.4)
CHLORIDE SERPL-SCNC: 104 MMOL/L (ref 98–112)
CHOLEST SERPL-MCNC: 159 MG/DL (ref ?–200)
CO2 SERPL-SCNC: 27 MMOL/L (ref 21–32)
CREAT BLD-MCNC: 0.71 MG/DL (ref 0.55–1.02)
DEPRECATED RDW RBC AUTO: 42 FL (ref 35.1–46.3)
EGFRCR SERPLBLD CKD-EPI 2021: 105 ML/MIN/1.73M2 (ref 60–?)
ERYTHROCYTE [DISTWIDTH] IN BLOOD BY AUTOMATED COUNT: 13.4 % (ref 11–15)
FASTING PATIENT LIPID ANSWER: YES
FASTING STATUS PATIENT QL REPORTED: YES
GLOBULIN PLAS-MCNC: 2.8 G/DL (ref 2–3.5)
GLUCOSE BLD-MCNC: 90 MG/DL (ref 70–99)
HCT VFR BLD AUTO: 41.3 % (ref 35–48)
HDLC SERPL-MCNC: 58 MG/DL (ref 40–59)
HGB BLD-MCNC: 13.4 G/DL (ref 12–16)
LDLC SERPL CALC-MCNC: 86 MG/DL (ref ?–100)
MCH RBC QN AUTO: 27.5 PG (ref 26–34)
MCHC RBC AUTO-ENTMCNC: 32.4 G/DL (ref 31–37)
MCV RBC AUTO: 84.6 FL (ref 80–100)
NONHDLC SERPL-MCNC: 101 MG/DL (ref ?–130)
OSMOLALITY SERPL CALC.SUM OF ELEC: 287 MOSM/KG (ref 275–295)
PLATELET # BLD AUTO: 265 10(3)UL (ref 150–450)
POTASSIUM SERPL-SCNC: 4.3 MMOL/L (ref 3.5–5.1)
PROT SERPL-MCNC: 7.2 G/DL (ref 5.7–8.2)
RBC # BLD AUTO: 4.88 X10(6)UL (ref 3.8–5.3)
SODIUM SERPL-SCNC: 139 MMOL/L (ref 136–145)
TRIGL SERPL-MCNC: 78 MG/DL (ref 30–149)
TSI SER-ACNC: 3.68 UIU/ML (ref 0.55–4.78)
VLDLC SERPL CALC-MCNC: 12 MG/DL (ref 0–30)
WBC # BLD AUTO: 7 X10(3) UL (ref 4–11)

## 2025-06-01 PROCEDURE — 80053 COMPREHEN METABOLIC PANEL: CPT

## 2025-06-01 PROCEDURE — 84443 ASSAY THYROID STIM HORMONE: CPT

## 2025-06-01 PROCEDURE — 85027 COMPLETE CBC AUTOMATED: CPT

## 2025-06-01 PROCEDURE — 80061 LIPID PANEL: CPT

## 2025-06-01 PROCEDURE — 36415 COLL VENOUS BLD VENIPUNCTURE: CPT

## 2025-06-03 ENCOUNTER — OFFICE VISIT (OUTPATIENT)
Dept: INTERNAL MEDICINE CLINIC | Facility: CLINIC | Age: 45
End: 2025-06-03
Payer: COMMERCIAL

## 2025-06-03 VITALS
DIASTOLIC BLOOD PRESSURE: 89 MMHG | WEIGHT: 275.81 LBS | SYSTOLIC BLOOD PRESSURE: 139 MMHG | HEART RATE: 80 BPM | HEIGHT: 64 IN | BODY MASS INDEX: 47.09 KG/M2 | OXYGEN SATURATION: 97 % | TEMPERATURE: 97 F

## 2025-06-03 DIAGNOSIS — Z00.00 PHYSICAL EXAM, ANNUAL: Primary | ICD-10-CM

## 2025-06-03 DIAGNOSIS — E03.8 SUBCLINICAL HYPOTHYROIDISM: ICD-10-CM

## 2025-06-03 DIAGNOSIS — E04.1 THYROID NODULE: ICD-10-CM

## 2025-06-03 PROCEDURE — 3079F DIAST BP 80-89 MM HG: CPT | Performed by: INTERNAL MEDICINE

## 2025-06-03 PROCEDURE — 3075F SYST BP GE 130 - 139MM HG: CPT | Performed by: INTERNAL MEDICINE

## 2025-06-03 PROCEDURE — 3008F BODY MASS INDEX DOCD: CPT | Performed by: INTERNAL MEDICINE

## 2025-06-03 PROCEDURE — 99396 PREV VISIT EST AGE 40-64: CPT | Performed by: INTERNAL MEDICINE

## 2025-06-03 NOTE — PROGRESS NOTES
Sejal Gonzales is a 45 year old female.  Chief Complaint   Patient presents with    Physical       HPI:   Patient comes for her annual physical  C/C annual physical  C/o overall doing well and has not been taking the hydroxyzine since the Xolair is working so well, has an upcoming appointment with Dr. Joshi the allergist      HISTORY  2024 VISIT   xolair worked well and then stopped it and has had 2 rxn since feb, hives comes and goes once mn       HISTORY  2023 VISIT    New pt -used to see Dr. Klein  C/C establish care  C/o annual physical   Currently getting allergy shots         PMH  Urticaria - on xolair - sees dr joshi   s/p thyroid lobectomy  Subclinical hypothyroidism- sees endo-  H/o IBS -better after avoiding breakfast and lunch and soda      Lives with  and works as a teacher HS -math - AP calc     Current Medications[1]   Past Medical History[2]   Past Surgical History[3]   Social History:  Short Social Hx on File[4]     REVIEW OF SYSTEMS:   GENERAL HEALTH: No fevers, chills, sweats, fatigue  VISION: No recent vision problems, blurry vision or double vision  HEENT: No decreased hearing ear pain nasal congestion or sore throat  SKIN: denies any unusual skin lesions or rashes  RESPIRATORY: denies shortness of breath, cough, wheezing  CARDIOVASCULAR: denies chest pain on exertion, palpitations, swelling in feet  GI: denies abdominal pain and denies heartburn, nausea or vomiting  : No Pain on urination, change in the color of urine, discharge, urinating frequently  MUS: No back pain, joint pain, muscle pain  NEURO: denies headaches , anxiety, depression    EXAM:   /89   Pulse 80   Temp 97.2 °F (36.2 °C)   Ht 5' 4\" (1.626 m)   Wt 275 lb 12.8 oz (125.1 kg)   LMP 05/20/2025 (Exact Date)   SpO2 97%   BMI 47.34 kg/m²   GENERAL: well developed, well nourished,in no apparent distress  SKIN: no rashes,no suspicious lesions  HEENT: atraumatic, normocephalic,ears and throat are clear, no frontal  or maxillary sinus tenderness, pupils equal reactive to light bilaterally, extraocular muscles intact  NECK: supple,no adenopathy, NONTENDER    LUNGS: clear to auscultation, no wheeze  CARDIO: RRR without murmur  GI: good BS's,no masses or tenderness  EXTREMITIES: no cyanosis, or edema    ASSESSMENT AND PLAN:   Diagnoses and all orders for this visit:    Physical exam, annual  Advised patient to watch what she eats exercise, seatbelt use no texting driving, sunscreen use advised    Subclinical hypothyroidism  -     Endocrine Referral - In Network  And   Thyroid nodule  -     Endocrine Referral - In Network    Has apt to see endo and [facility] already scheduled before that       Preventive medicine  Colonoscopy- [facility] 2035 dr patterson   Mammogram - 6/2024  Pap- dr syde and 2021 dr weaver , 7/2024   Labs  10/2024 AND 6/2025 reviewed      The patient indicates understanding of these issues and agrees to the plan.  No follow-ups on file.       [1]   Current Outpatient Medications   Medication Sig Dispense Refill    predniSONE 10 MG Oral Tab Take 40mg q day x 5 days,then 30mg x 3 days,then 20 mg x 3 days,then10 mg x 3 days with food 38 tablet 0    omalizumab (XOLAIR) 300 mg/2mL Subcutaneous Solution Prefilled Syringe Inject 300 mg into the skin every 28 days. ICD 10 Code: L50.1 2 each 5    EPINEPHrine (EPIPEN 2-BRITTANY) 0.3 MG/0.3ML Injection Solution Auto-injector Inject IM in event of  allergic reaction 2 each 0    Ergocalciferol (VITAMIN D OR) Take 2,000 Units by mouth daily.      Multiple Vitamins-Minerals (MULTI-VITAMIN/MINERALS) Oral Tab Take 1 tablet by mouth daily.      levocetirizine (XYZAL) 5 MG Oral Tab Take 1 tablet (5 mg total) by mouth nightly. 30 tablet 5    hydrOXYzine 25 MG Oral Tab Take 1 tablet (25 mg total) by mouth every 4 (four) hours as needed for Itching. (Patient not taking: Reported on 6/3/2025) 30 tablet 0   [2]   Past Medical History:   Angioedema    IBS (irritable bowel syndrome)    diet    PONV  (postoperative nausea and vomiting)    Visual impairment    contacts, eyeglasses   [3]   Past Surgical History:  Procedure Laterality Date    Cholecystectomy  2005    Colonoscopy  2/25/2010    Colonoscopy      Colonoscopy N/A 2/12/2025    Procedure: COLONOSCOPY;  Surgeon: Tyrese Adames MD;  Location: [facility]    Thyroid lobectomy,unilat  01/17/2022    Upper gi endoscopy,exam      [place] teeth removed     [4]   Social History  Socioeconomic History    Marital status:    Tobacco Use    Smoking status: Never     Passive exposure: Never    Smokeless tobacco: Never    Tobacco comments:     No Household Smokers.    Vaping Use    Vaping status: Never Used   Substance and Sexual Activity    Alcohol use: No     Alcohol/week: 0.0 standard drinks of alcohol    Drug use: No    Sexual activity: Yes     Partners: Male   Other Topics Concern    Caffeine Concern Yes     Comment: coffee, 3cups weekly     Social Drivers of Health     Food Insecurity: No Food Insecurity (6/3/2025)    NCSS - Food Insecurity     Worried About Running Out of Food in the Last Year: No     Ran Out of Food in the Last Year: No   Transportation Needs: No Transportation Needs (6/3/2025)    NCSS - Transportation     Lack of Transportation: No   Housing Stability: Not At Risk (6/3/2025)    NCSS - Housing/Utilities     Has Housing: Yes     Worried About Losing Housing: No     Unable to Get Utilities: No

## 2025-06-17 ENCOUNTER — HOSPITAL ENCOUNTER (OUTPATIENT)
Dept: MAMMOGRAPHY | Age: 45
Discharge: HOME | End: 2025-06-17
Attending: INTERNAL MEDICINE
Payer: COMMERCIAL

## 2025-06-17 ENCOUNTER — NURSE ONLY (OUTPATIENT)
Dept: ALLERGY | Facility: CLINIC | Age: 45
End: 2025-06-17
Payer: COMMERCIAL

## 2025-06-17 VITALS
DIASTOLIC BLOOD PRESSURE: 86 MMHG | OXYGEN SATURATION: 94 % | WEIGHT: 275 LBS | HEART RATE: 93 BPM | BODY MASS INDEX: 47 KG/M2 | SYSTOLIC BLOOD PRESSURE: 147 MMHG

## 2025-06-17 DIAGNOSIS — Z12.31 ENCOUNTER FOR MAMMOGRAM TO ESTABLISH BASELINE MAMMOGRAM: ICD-10-CM

## 2025-06-17 DIAGNOSIS — L50.1 CHRONIC IDIOPATHIC URTICARIA: Primary | ICD-10-CM

## 2025-06-17 PROCEDURE — 77067 SCR MAMMO BI INCL CAD: CPT | Performed by: INTERNAL MEDICINE

## 2025-06-17 PROCEDURE — 96372 THER/PROPH/DIAG INJ SC/IM: CPT | Performed by: ALLERGY & IMMUNOLOGY

## 2025-06-17 PROCEDURE — 3079F DIAST BP 80-89 MM HG: CPT | Performed by: ALLERGY & IMMUNOLOGY

## 2025-06-17 PROCEDURE — 77063 BREAST TOMOSYNTHESIS BI: CPT | Performed by: INTERNAL MEDICINE

## 2025-06-17 PROCEDURE — 3077F SYST BP >= 140 MM HG: CPT | Performed by: ALLERGY & IMMUNOLOGY

## 2025-06-17 NOTE — PROGRESS NOTES
Patient presented to the Allergy office for Xolair injection for chronic hives.     See vital sign template for vital signs.    Reviewed Allergies, Histories and Medications with patient.    Xolair 300 mg given left upper arm subcutaneously.    Patient was observed in the office for 30 minutes and was reassessed and was released from the office without complications.    Xolair - LOT#[id number]              EXP. 06/2026              NDC 70878-954-80    Next Appointment for Xolair is scheduled for 7/15/25.

## 2025-06-20 ENCOUNTER — TELEPHONE (OUTPATIENT)
Dept: ALLERGY | Facility: CLINIC | Age: 45
End: 2025-06-20

## 2025-06-20 NOTE — TELEPHONE ENCOUNTER
[facility] contacted at [phone].     RN spoke with pharmacy , Charlotte.    Attempted to schedule Xolair delivery.     Rep offers that patient's case is still in Major Medical Benefit Investigation.    RN will call [facility] next week to complete delivery request for Xolair.

## 2025-06-24 NOTE — TELEPHONE ENCOUNTER
[facility] called to advise a PA is needed for below medication.

## 2025-06-25 NOTE — TELEPHONE ENCOUNTER
Spoke to Aminah Senior .   They show a valid prior authorization    They were not verifying the PA correctly.   They will have it verified within the next 24 hours.   She confirmed that they have the verification approval that we faxed over on 2/4/2025    If we do not receive a call by tomorrow this time, call back.

## 2025-06-27 NOTE — TELEPHONE ENCOUNTER
[facility] contacted at [phone].     RN spoke with pharmacy , Leesa.    Unable to schedule delivery of Xolair at this time as case is still in Insurance Verification.

## 2025-07-01 NOTE — TELEPHONE ENCOUNTER
[facility] contacted at [phone].     RN spoke with pharmacy , Diomedes.     Xolair 300 mg/2 mL prefilled syringe scheduled for delivery to physician's office on 7/3/2025.     Patient's next scheduled Xolair injection appointment: 7/15/2025.

## 2025-07-05 ENCOUNTER — PATIENT MESSAGE (OUTPATIENT)
Dept: INTERNAL MEDICINE CLINIC | Facility: CLINIC | Age: 45
End: 2025-07-05

## 2025-07-07 NOTE — TELEPHONE ENCOUNTER
Patient was seen on 6/3/25 and labs were ordered. Please advise when next set of labs should be done.     Natalia Hernández MD  6/3/2025 10:01 AM CDT       D/w pt at visit        Future Appointments   Date Time Provider Department Center   7/15/2025  9:10 AM [facility] [place]   8/12/2025  4:20 PM [facility]   9/9/2025  4:20 PM [facility]   10/7/2025  4:20 PM [facility]   11/4/2025  4:30 PM Jose Carlos Joshi MD [facility]   12/13/2025 10:15 AM [facility] [place]   12/29/2025  8:00 AM Nicci White MD [facility] [place]

## 2025-07-15 ENCOUNTER — NURSE ONLY (OUTPATIENT)
Dept: ALLERGY | Facility: CLINIC | Age: 45
End: 2025-07-15
Payer: COMMERCIAL

## 2025-07-15 VITALS — SYSTOLIC BLOOD PRESSURE: 130 MMHG | DIASTOLIC BLOOD PRESSURE: 80 MMHG | HEART RATE: 78 BPM | OXYGEN SATURATION: 96 %

## 2025-07-15 DIAGNOSIS — L50.1 IDIOPATHIC URTICARIA: Primary | ICD-10-CM

## 2025-07-15 PROCEDURE — 3075F SYST BP GE 130 - 139MM HG: CPT | Performed by: ALLERGY & IMMUNOLOGY

## 2025-07-15 PROCEDURE — 96372 THER/PROPH/DIAG INJ SC/IM: CPT | Performed by: ALLERGY & IMMUNOLOGY

## 2025-07-15 PROCEDURE — 3079F DIAST BP 80-89 MM HG: CPT | Performed by: ALLERGY & IMMUNOLOGY

## 2025-07-15 NOTE — PROGRESS NOTES
Patient presented to the Dr. Joshi's office for a Xolair injection for chronic hives.     See vital sign template for vital signs.    Reviewed histories, medications and allergies with patient.    Xolair 300mg- 1 syringe given left upper arm subcutaneously.    Patient was observed in the office for 30 minutes and was reassessed and was released form the office without complications.    Next Xolair appointment is 8/12/25.    Xolair- LOT #[id number]             EXP.06.2026              NDC 82127-042-95.

## 2025-08-01 ENCOUNTER — TELEPHONE (OUTPATIENT)
Dept: ALLERGY | Facility: CLINIC | Age: 45
End: 2025-08-01

## 2025-08-12 ENCOUNTER — NURSE ONLY (OUTPATIENT)
Dept: ALLERGY | Facility: CLINIC | Age: 45
End: 2025-08-12

## 2025-08-12 VITALS — DIASTOLIC BLOOD PRESSURE: 75 MMHG | OXYGEN SATURATION: 97 % | SYSTOLIC BLOOD PRESSURE: 119 MMHG | HEART RATE: 80 BPM

## 2025-08-12 DIAGNOSIS — L50.1 CHRONIC IDIOPATHIC URTICARIA: Primary | ICD-10-CM

## 2025-08-12 PROCEDURE — 3074F SYST BP LT 130 MM HG: CPT | Performed by: ALLERGY & IMMUNOLOGY

## 2025-08-12 PROCEDURE — 3078F DIAST BP <80 MM HG: CPT | Performed by: ALLERGY & IMMUNOLOGY

## 2025-08-12 PROCEDURE — 96372 THER/PROPH/DIAG INJ SC/IM: CPT | Performed by: ALLERGY & IMMUNOLOGY

## 2025-08-12 RX ORDER — OMALIZUMAB 300 MG/2ML
300 INJECTION, SOLUTION SUBCUTANEOUS
Qty: 2 EACH | Refills: 2 | Status: SHIPPED | OUTPATIENT
Start: 2025-08-12

## 2025-08-25 RX ORDER — OMALIZUMAB 300 MG/2ML
INJECTION, SOLUTION SUBCUTANEOUS
Qty: 2 EACH | Refills: 0 | Status: SHIPPED | OUTPATIENT
Start: 2025-08-25

## (undated) DIAGNOSIS — L50.1 CHRONIC IDIOPATHIC URTICARIA: Primary | ICD-10-CM

## (undated) DEVICE — SUTURE SILK 2-0 SA65H

## (undated) DEVICE — SUTURE CHROMIC GUT 3-0 SH

## (undated) DEVICE — ARISTA 1 GRAM

## (undated) DEVICE — NECK ACCESSORY: Brand: MEDLINE INDUSTRIES, INC.

## (undated) DEVICE — SUCTION CANISTER, 3000CC,SAFELINER: Brand: DEROYAL

## (undated) DEVICE — HEAD & NECK: Brand: MEDLINE INDUSTRIES, INC.

## (undated) DEVICE — SUTURE PROLENE 4-0 FS-2

## (undated) DEVICE — SUTURE SILK 2-0 FS

## (undated) DEVICE — ENCORE® LATEX ACCLAIM SIZE 8, STERILE LATEX POWDER-FREE SURGICAL GLOVE: Brand: ENCORE

## (undated) DEVICE — SOL  .9 1000ML BTL

## (undated) NOTE — LETTER
Michelle Post,     This is the [facility], office of Dr. Natalia Hernández    Thank you for putting your trust in [facility].  Our goal is to deliver the highest quality healthcare and an exceptional patient experience. Upon reviewing of your medical record shows you are due for the following:       Annual Physical Exam  Colonoscopy  Pap Smear   Blood pressure follow up         Please call [phone] to schedule your appointment or schedule online via [employer].     If you changed to a new provider at another facility, please notify the clinic to update your records.    If you had any recent testing at another facility, please have your results faxed to our office at [phone].             Thank you and have a great day!

## (undated) NOTE — LETTER
10/17/2020              Donna Olvera        [address] [address]         To Whom It May Concern,    Sejal symptoms are not related to Covid-19. She has a preexisting health condition.        Sincerely,    MD Norma Lazar

## (undated) NOTE — LETTER
[address] [place] [address]  Authorization for Surgical Operation or Procedure    1.  I hereby authorize  _______________, my physician and the assistant, to perform the following operation and/or procedure:  ULTRASOUND GUIDED RIGHT TH be performed for the purposes of advancing medicine, science, and/or education, provided my identity is not revealed. If the procedure has been videotaped, the physician/surgeon will obtain the original videotape.  The hospital will not be responsible for s proposed treatment. I have also explained the risks and benefits involved in the refusal of the proposed treatment and have answered the patient's questions.  If I have a significant financial interest in a co-management agreement or a significant financial

## (undated) NOTE — LETTER
1/25/2022          To Whom It May Concern:    Behzad Degroot is currently under my medical care at this time and has been off work since January 18 th, 2022. She may return to school on Friday January 28 th, 2022  Activity is restricted as follows: none.

## (undated) NOTE — LETTER
Dilip Sanchez, [address] [facility]  Aurelio Long Simón       11/10/21        Patient: Rosana Thornton   YOB: 1980   Date of Visit: 11/9/2021       Dear  Dr. Jourdan Sargent MD,      Thank you for referring Rosana Thornton to my practice.   Ple